# Patient Record
Sex: MALE | Employment: UNEMPLOYED | URBAN - METROPOLITAN AREA
[De-identification: names, ages, dates, MRNs, and addresses within clinical notes are randomized per-mention and may not be internally consistent; named-entity substitution may affect disease eponyms.]

---

## 2017-01-18 DIAGNOSIS — N40.0 BENIGN NON-NODULAR PROSTATIC HYPERPLASIA WITHOUT LOWER URINARY TRACT SYMPTOMS: ICD-10-CM

## 2017-01-19 RX ORDER — FINASTERIDE 5 MG/1
TABLET, FILM COATED ORAL
Qty: 90 TABLET | Refills: 1 | Status: SHIPPED | OUTPATIENT
Start: 2017-01-19 | End: 2017-05-12 | Stop reason: SDUPTHER

## 2017-01-23 RX ORDER — CLOPIDOGREL BISULFATE 75 MG/1
TABLET ORAL
Qty: 90 TABLET | Refills: 3 | Status: SHIPPED | OUTPATIENT
Start: 2017-01-23 | End: 2018-01-23 | Stop reason: SDUPTHER

## 2017-01-23 RX ORDER — CARVEDILOL 3.12 MG/1
TABLET ORAL
Qty: 180 TABLET | Refills: 3 | Status: SHIPPED | OUTPATIENT
Start: 2017-01-23 | End: 2018-01-23 | Stop reason: SDUPTHER

## 2017-02-20 RX ORDER — TAMSULOSIN HYDROCHLORIDE 0.4 MG/1
CAPSULE ORAL
Qty: 30 CAPSULE | Refills: 5 | Status: SHIPPED | OUTPATIENT
Start: 2017-02-20 | End: 2017-08-20 | Stop reason: SDUPTHER

## 2017-03-20 RX ORDER — SIMVASTATIN 40 MG
TABLET ORAL
Qty: 90 TABLET | Refills: 3 | Status: SHIPPED | OUTPATIENT
Start: 2017-03-20 | End: 2018-03-22 | Stop reason: SDUPTHER

## 2017-04-10 LAB
CHOLESTEROL, TOTAL: 125 MG/DL
CHOLESTEROL/HDL RATIO: 3.4
HDLC SERPL-MCNC: 37 MG/DL (ref 35–70)
LDL CHOLESTEROL CALCULATED: 60.2 MG/DL (ref 0–160)
PROSTATE SPECIFIC ANTIGEN: 1.23 NG/ML
TRIGL SERPL-MCNC: 139 MG/DL
VLDLC SERPL CALC-MCNC: 28 MG/DL

## 2017-04-11 DIAGNOSIS — N40.0 BENIGN PROSTATIC HYPERPLASIA WITHOUT LOWER URINARY TRACT SYMPTOMS, UNSPECIFIED MORPHOLOGY: ICD-10-CM

## 2017-04-11 DIAGNOSIS — E78.5 HYPERLIPIDEMIA, UNSPECIFIED HYPERLIPIDEMIA TYPE: ICD-10-CM

## 2017-04-11 DIAGNOSIS — E11.8 TYPE 2 DIABETES MELLITUS WITH COMPLICATION, WITHOUT LONG-TERM CURRENT USE OF INSULIN (HCC): Chronic | ICD-10-CM

## 2017-04-24 ENCOUNTER — OFFICE VISIT (OUTPATIENT)
Dept: PRIMARY CARE CLINIC | Age: 67
End: 2017-04-24
Payer: MEDICARE

## 2017-04-24 VITALS
DIASTOLIC BLOOD PRESSURE: 72 MMHG | SYSTOLIC BLOOD PRESSURE: 124 MMHG | HEIGHT: 71 IN | RESPIRATION RATE: 18 BRPM | HEART RATE: 64 BPM | WEIGHT: 200 LBS | BODY MASS INDEX: 28 KG/M2

## 2017-04-24 DIAGNOSIS — E78.5 HYPERLIPIDEMIA, UNSPECIFIED HYPERLIPIDEMIA TYPE: ICD-10-CM

## 2017-04-24 DIAGNOSIS — E11.8 TYPE 2 DIABETES MELLITUS WITH COMPLICATION, WITHOUT LONG-TERM CURRENT USE OF INSULIN (HCC): Primary | Chronic | ICD-10-CM

## 2017-04-24 DIAGNOSIS — I63.9 ISCHEMIC STROKE OF FRONTAL LOBE (HCC): ICD-10-CM

## 2017-04-24 LAB — HBA1C MFR BLD: 7.4 %

## 2017-04-24 PROCEDURE — 99214 OFFICE O/P EST MOD 30 MIN: CPT | Performed by: NURSE PRACTITIONER

## 2017-04-24 PROCEDURE — 83036 HEMOGLOBIN GLYCOSYLATED A1C: CPT | Performed by: NURSE PRACTITIONER

## 2017-04-24 ASSESSMENT — ENCOUNTER SYMPTOMS
SINUS PRESSURE: 0
NAUSEA: 0
COUGH: 0
BLOOD IN STOOL: 0
WHEEZING: 0
SHORTNESS OF BREATH: 0
CONSTIPATION: 0
DIARRHEA: 0
SORE THROAT: 0
ABDOMINAL PAIN: 0
VOMITING: 0
TROUBLE SWALLOWING: 0

## 2017-04-24 ASSESSMENT — PATIENT HEALTH QUESTIONNAIRE - PHQ9
1. LITTLE INTEREST OR PLEASURE IN DOING THINGS: 0
2. FEELING DOWN, DEPRESSED OR HOPELESS: 0
SUM OF ALL RESPONSES TO PHQ9 QUESTIONS 1 & 2: 0
SUM OF ALL RESPONSES TO PHQ QUESTIONS 1-9: 0

## 2017-08-09 ENCOUNTER — TELEPHONE (OUTPATIENT)
Dept: NEUROLOGY | Age: 67
End: 2017-08-09

## 2017-08-14 ENCOUNTER — OFFICE VISIT (OUTPATIENT)
Dept: NEUROLOGY | Age: 67
End: 2017-08-14
Payer: MEDICARE

## 2017-08-14 VITALS
HEART RATE: 79 BPM | WEIGHT: 190 LBS | HEIGHT: 72 IN | BODY MASS INDEX: 25.73 KG/M2 | SYSTOLIC BLOOD PRESSURE: 130 MMHG | DIASTOLIC BLOOD PRESSURE: 82 MMHG

## 2017-08-14 DIAGNOSIS — I63.032 CEREBROVASCULAR ACCIDENT (CVA) DUE TO THROMBOSIS OF LEFT CAROTID ARTERY (HCC): Primary | ICD-10-CM

## 2017-08-14 DIAGNOSIS — G40.309 GENERALIZED SEIZURE DISORDER (HCC): ICD-10-CM

## 2017-08-14 DIAGNOSIS — R52 PAIN AGGRAVATED BY PHYSICAL ACTIVITY: ICD-10-CM

## 2017-08-14 PROCEDURE — 99214 OFFICE O/P EST MOD 30 MIN: CPT | Performed by: PSYCHIATRY & NEUROLOGY

## 2017-08-14 RX ORDER — INDOMETHACIN 75 MG/1
75 CAPSULE, EXTENDED RELEASE ORAL
Qty: 30 CAPSULE | Refills: 1 | Status: SHIPPED | OUTPATIENT
Start: 2017-08-14 | End: 2018-01-25 | Stop reason: ALTCHOICE

## 2017-08-21 RX ORDER — TAMSULOSIN HYDROCHLORIDE 0.4 MG/1
CAPSULE ORAL
Qty: 30 CAPSULE | Refills: 5 | Status: SHIPPED | OUTPATIENT
Start: 2017-08-21 | End: 2018-02-16 | Stop reason: SDUPTHER

## 2017-08-22 ENCOUNTER — TELEPHONE (OUTPATIENT)
Dept: NEUROLOGY | Age: 67
End: 2017-08-22

## 2017-08-29 ENCOUNTER — TELEPHONE (OUTPATIENT)
Dept: NEUROLOGY | Age: 67
End: 2017-08-29

## 2017-09-12 ENCOUNTER — OFFICE VISIT (OUTPATIENT)
Dept: PRIMARY CARE CLINIC | Age: 67
End: 2017-09-12
Payer: MEDICARE

## 2017-09-12 VITALS
SYSTOLIC BLOOD PRESSURE: 144 MMHG | HEART RATE: 70 BPM | BODY MASS INDEX: 25.73 KG/M2 | RESPIRATION RATE: 16 BRPM | WEIGHT: 190 LBS | OXYGEN SATURATION: 97 % | HEIGHT: 72 IN | DIASTOLIC BLOOD PRESSURE: 80 MMHG

## 2017-09-12 DIAGNOSIS — L89.311 DECUBITUS ULCER OF RIGHT BUTTOCK, STAGE 1: Primary | ICD-10-CM

## 2017-09-12 PROCEDURE — 99214 OFFICE O/P EST MOD 30 MIN: CPT | Performed by: INTERNAL MEDICINE

## 2017-09-12 RX ORDER — BACITRACIN ZINC AND POLYMYXIN B SULFATE 500; 1000 [USP'U]/G; [USP'U]/G
OINTMENT TOPICAL
Qty: 28.35 G | Refills: 3 | Status: SHIPPED | OUTPATIENT
Start: 2017-09-12 | End: 2017-09-19

## 2017-09-12 ASSESSMENT — ENCOUNTER SYMPTOMS
VOMITING: 0
ABDOMINAL PAIN: 0
EYE DISCHARGE: 0
NAUSEA: 0
TROUBLE SWALLOWING: 0
COUGH: 0
BACK PAIN: 0
EYE REDNESS: 0
SHORTNESS OF BREATH: 0
SORE THROAT: 0

## 2017-09-13 DIAGNOSIS — I63.9 ISCHEMIC STROKE OF FRONTAL LOBE (HCC): ICD-10-CM

## 2017-09-18 RX ORDER — LEVETIRACETAM 250 MG/1
TABLET ORAL
Qty: 360 TABLET | Refills: 1 | Status: SHIPPED | OUTPATIENT
Start: 2017-09-18 | End: 2018-03-23 | Stop reason: SDUPTHER

## 2017-10-26 DIAGNOSIS — E11.8 DIABETES MELLITUS TYPE 2 WITH COMPLICATIONS (HCC): ICD-10-CM

## 2017-10-26 DIAGNOSIS — E11.8 TYPE 2 DIABETES MELLITUS WITH COMPLICATION (HCC): Chronic | ICD-10-CM

## 2017-10-26 RX ORDER — METFORMIN HYDROCHLORIDE 500 MG/1
TABLET, EXTENDED RELEASE ORAL
Qty: 360 TABLET | Refills: 3 | Status: SHIPPED | OUTPATIENT
Start: 2017-10-26 | End: 2018-01-25 | Stop reason: ALTCHOICE

## 2017-10-26 NOTE — TELEPHONE ENCOUNTER
Health Maintenance   Topic Date Due    AAA screen  1950    Hepatitis C screen  1950    HIV screen  11/12/1966    DTaP/Tdap/Td vaccine (1 - Tdap) 11/12/1970    Flu vaccine (1) 09/01/2017    Diabetic microalbuminuria test  11/01/2017 (Originally 10/27/2016)    Pneumococcal low/med risk (1 of 2 - PCV13) 04/24/2018 (Originally 11/12/2016)    Colon cancer screen colonoscopy  04/28/2018 (Originally 11/12/2001)    Lipid screen  04/10/2018    Diabetic foot exam  04/24/2018    Diabetic hemoglobin A1C test  04/24/2018    Diabetic retinal exam  04/24/2018    Zostavax vaccine  Addressed             (applicable per patient's age: Cancer Screenings, Depression Screening, Fall Risk Screening, Immunizations)    Hemoglobin A1C (%)   Date Value   04/24/2017 7.4   11/29/2016 8.3   06/14/2016 7.6     LDL Calculated (mg/dL)   Date Value   04/10/2017 60.2      (goal A1C is < 7)   (goal LDL is <100) need 30-50% reduction from baseline     BP Readings from Last 3 Encounters:   09/12/17 (!) 144/80   08/14/17 130/82   04/24/17 124/72    (goal /80)      All Future Testing planned in [facility]:  Lab Frequency Next Occurrence   Hepatitis C Antibody Once 11/29/2017       Next Visit Date:  Future Appointments  Date Time Provider Rose Rodriguez   11/21/2017 8:20 AM Ale Moore MD [facility]   12/12/2017 10:00 AM Sherrel Homans, MD [facility]            Patient Active Problem List:     Ischemic stroke of frontal lobe ([facility] [address]     Generalized seizure disorder ([address]     DM2 (diabetes mellitus, type 2) ([address]     Hyperlipidemia     Benign prostatic hyperplasia without lower urinary tract symptoms     Coronary artery disease involving native heart without angina pectoris

## 2017-11-20 ENCOUNTER — TELEPHONE (OUTPATIENT)
Dept: NEUROLOGY | Age: 67
End: 2017-11-20

## 2017-11-20 NOTE — LETTER
[id number] [facility] Neurology Specialist  [place] [address]  Phone: [phone]  Fax: [phone]    Spring Acosta MD        November 20, 2017     Patient: Alyssa Zarco   YOB: 1950   Date of Visit: 11/20/2017       To Whom It May Concern: It is my medical opinion that Trinh Mackay requires a disability parking placard for the following reasons:  He has limited walking ability due to a neurologic condition, stroke and seizures. Duration of need: permanent or greater than 7 years.               Spring Acosta MD

## 2017-12-05 ENCOUNTER — PATIENT MESSAGE (OUTPATIENT)
Dept: PRIMARY CARE CLINIC | Age: 67
End: 2017-12-05

## 2017-12-07 DIAGNOSIS — I10 BENIGN ESSENTIAL HTN: ICD-10-CM

## 2017-12-07 RX ORDER — LISINOPRIL 10 MG/1
TABLET ORAL
Qty: 90 TABLET | Refills: 1 | Status: SHIPPED | OUTPATIENT
Start: 2017-12-07 | End: 2018-06-08 | Stop reason: SDUPTHER

## 2017-12-07 NOTE — TELEPHONE ENCOUNTER
Last OV 9/12/17    Health Maintenance   Topic Date Due    AAA screen  1950    Hepatitis C screen  1950    DTaP/Tdap/Td vaccine (1 - Tdap) 11/12/1970    Diabetic microalbuminuria test  10/27/2016    Flu vaccine (1) 09/01/2017    Pneumococcal low/med risk (1 of 2 - PCV13) 04/24/2018 (Originally 11/12/2016)    Colon cancer screen colonoscopy  04/28/2018 (Originally 11/12/2001)    Lipid screen  04/10/2018    Diabetic foot exam  04/24/2018    Diabetic hemoglobin A1C test  04/24/2018    Diabetic retinal exam  04/24/2018    Zostavax vaccine  Addressed             (applicable per patient's age: Cancer Screenings, Depression Screening, Fall Risk Screening, Immunizations)    Hemoglobin A1C (%)   Date Value   04/24/2017 7.4   11/29/2016 8.3   06/14/2016 7.6     LDL Calculated (mg/dL)   Date Value   04/10/2017 60.2      (goal A1C is < 7)   (goal LDL is <100) need 30-50% reduction from baseline     BP Readings from Last 3 Encounters:   09/12/17 (!) 144/80   08/14/17 130/82   04/24/17 124/72    (goal /80)      All Future Testing planned in [facility]:  Lab Frequency Next Occurrence       Next Visit Date:  Future Appointments  Date Time Provider Rose Rodriguez   12/12/2017 10:00 AM Loida Gurrola MD [facility]            Patient Active Problem List:     Ischemic stroke of frontal lobe ([facility] [address]     Generalized seizure disorder ([address]     DM2 (diabetes mellitus, type 2) ([address]     Hyperlipidemia     Benign prostatic hyperplasia without lower urinary tract symptoms     Coronary artery disease involving native heart without angina pectoris

## 2018-01-15 NOTE — TELEPHONE ENCOUNTER
From: Low Dugan  Sent: 1/15/2018 10:56 AM EST  To: Ter[facility]rell Clinical Staff  Subject: RE: Visit Follow-Up Question    Hello, we cancelled [address] for tomorrow because of weather. we will need a new appointment.  ----- Message -----  From: Chelly Esqueda  Sent: 12/5/2017 7:32 AM EST  To: Savannah Aragon  Subject: RE: Visit Follow-Up Question  No problem, please give our office a call at [phone] to make an appointment. Thank you        ----- Message -----   From: Savannah Aragon   Sent: 12/5/2017 7:28 AM EST   To: Liam Jean MD  Subject: Visit Follow-Up Question    Dylan needs to reschedule his appointment. Perhaps in January?

## 2018-01-23 NOTE — TELEPHONE ENCOUNTER
Last OV 9/12/17    Health Maintenance   Topic Date Due    AAA screen  1950    Hepatitis C screen  1950    DTaP/Tdap/Td vaccine (1 - Tdap) 11/12/1970    Diabetic microalbuminuria test  10/27/2016    Flu vaccine (1) 09/01/2017    Pneumococcal low/med risk (1 of 2 - PCV13) 04/24/2018 (Originally 11/12/2016)    Colon cancer screen colonoscopy  04/28/2018 (Originally 11/12/2001)    Lipid screen  04/10/2018    Potassium monitoring  04/10/2018    Creatinine monitoring  04/10/2018    Diabetic foot exam  04/24/2018    A1C test (Diabetic or Prediabetic)  04/24/2018    Diabetic retinal exam  04/24/2018    Zostavax vaccine  Addressed             (applicable per patient's age: Cancer Screenings, Depression Screening, Fall Risk Screening, Immunizations)    Hemoglobin A1C (%)   Date Value   04/24/2017 7.4   11/29/2016 8.3   06/14/2016 7.6     LDL Calculated (mg/dL)   Date Value   04/10/2017 60.2      (goal A1C is < 7)   (goal LDL is <100) need 30-50% reduction from baseline     BP Readings from Last 3 Encounters:   09/12/17 (!) 144/80   08/14/17 130/82   04/24/17 124/72    (goal /80)      All Future Testing planned in [facility]:  Lab Frequency Next Occurrence   Hepatitis C Antibody Once 12/08/2018       Next Visit Date:  Future Appointments  Date Time Provider Rose Rodriguez   1/25/2018 11:00 AM José Miguel Garcia MD [facility]            Patient Active Problem List:     Ischemic stroke of frontal lobe ([facility] [address]     Generalized seizure disorder ([address]     DM2 (diabetes mellitus, type 2) ([address]     Hyperlipidemia     Benign prostatic hyperplasia without lower urinary tract symptoms     Coronary artery disease involving native heart without angina pectoris

## 2018-01-24 RX ORDER — CARVEDILOL 3.12 MG/1
TABLET ORAL
Qty: 180 TABLET | Refills: 3 | Status: SHIPPED | OUTPATIENT
Start: 2018-01-24 | End: 2018-11-01 | Stop reason: SDUPTHER

## 2018-01-24 RX ORDER — CLOPIDOGREL BISULFATE 75 MG/1
TABLET ORAL
Qty: 90 TABLET | Refills: 3 | Status: SHIPPED | OUTPATIENT
Start: 2018-01-24 | End: 2019-01-11 | Stop reason: SDUPTHER

## 2018-01-25 ENCOUNTER — OFFICE VISIT (OUTPATIENT)
Dept: PRIMARY CARE CLINIC | Age: 68
End: 2018-01-25
Payer: MEDICARE

## 2018-01-25 VITALS
HEIGHT: 72 IN | OXYGEN SATURATION: 94 % | HEART RATE: 60 BPM | SYSTOLIC BLOOD PRESSURE: 122 MMHG | DIASTOLIC BLOOD PRESSURE: 64 MMHG | TEMPERATURE: 98.2 F

## 2018-01-25 DIAGNOSIS — E11.8 TYPE 2 DIABETES MELLITUS WITH COMPLICATION, WITHOUT LONG-TERM CURRENT USE OF INSULIN (HCC): Primary | Chronic | ICD-10-CM

## 2018-01-25 DIAGNOSIS — I10 ESSENTIAL HYPERTENSION: ICD-10-CM

## 2018-01-25 LAB — HBA1C MFR BLD: 7.5 %

## 2018-01-25 PROCEDURE — 83036 HEMOGLOBIN GLYCOSYLATED A1C: CPT | Performed by: INTERNAL MEDICINE

## 2018-01-25 PROCEDURE — 99214 OFFICE O/P EST MOD 30 MIN: CPT | Performed by: INTERNAL MEDICINE

## 2018-01-25 RX ORDER — ALOGLIPTIN AND METFORMIN HYDROCHLORIDE 12.5; 1 MG/1; MG/1
TABLET, FILM COATED ORAL
Qty: 30 TABLET | Refills: 5 | Status: SHIPPED | OUTPATIENT
Start: 2018-01-25 | End: 2018-05-10 | Stop reason: ALTCHOICE

## 2018-01-25 ASSESSMENT — ENCOUNTER SYMPTOMS
COUGH: 0
VOMITING: 0
ABDOMINAL PAIN: 0
NAUSEA: 0
BACK PAIN: 0
EYE DISCHARGE: 0
SORE THROAT: 0
EYE REDNESS: 0
TROUBLE SWALLOWING: 0
SHORTNESS OF BREATH: 0

## 2018-01-25 NOTE — PROGRESS NOTES
Visit Information    Have you changed or started any medications since your last visit including any over-the-counter medicines, vitamins, or herbal medicines? no   Have you stopped taking any of your medications? Is so, why? -  no  Are you having any side effects from any of your medications? - no    Have you seen any other physician or provider since your last visit?  no   Have you had any other diagnostic tests since your last visit?  no   Have you been seen in the emergency room and/or had an admission in a hospital since we last saw you?  no   Have you had your routine dental cleaning in the past 6 months?  no     Do you have an active MyChart account? If no, what is the barrier?   No    Patient Care Team:  Liam Jean MD as PCP - General (Internal Medicine)  Maria Del Carmen Garcia MD as Consulting Physician (Hematology and Oncology)    Medical History Review  Past Medical, Family, and Social History reviewed and does not contribute to the patient presenting condition    Health Maintenance   Topic Date Due    AAA screen  1950    Hepatitis C screen  1950    DTaP/Tdap/Td vaccine (1 - Tdap) 11/12/1970    Diabetic microalbuminuria test  10/27/2016    Flu vaccine (1) 09/01/2017    Pneumococcal low/med risk (1 of 2 - PCV13) 04/24/2018 (Originally 11/12/2016)    Colon cancer screen colonoscopy  04/28/2018 (Originally 11/12/2001)    Lipid screen  04/10/2018    Potassium monitoring  04/10/2018    Creatinine monitoring  04/10/2018    Diabetic foot exam  04/24/2018    A1C test (Diabetic or Prediabetic)  04/24/2018    Diabetic retinal exam  04/24/2018    Zostavax vaccine  Addressed
microalbuminuria test  10/27/2016    Flu vaccine (1) 09/01/2017    Pneumococcal low/med risk (1 of 2 - PCV13) 04/24/2018 (Originally 11/12/2016)    Colon cancer screen colonoscopy  04/28/2018 (Originally 11/12/2001)    Lipid screen  04/10/2018    Potassium monitoring  04/10/2018    Creatinine monitoring  04/10/2018    Diabetic foot exam  04/24/2018    A1C test (Diabetic or Prediabetic)  04/24/2018    Diabetic retinal exam  04/24/2018    Zostavax vaccine  Addressed       Subjective:      Review of Systems   Constitutional: Negative for activity change, appetite change, fatigue, fever and unexpected weight change. HENT: Negative for postnasal drip, sore throat and trouble swallowing. Eyes: Negative for discharge and redness. Respiratory: Negative for cough and shortness of breath. Cardiovascular: Negative for chest pain and palpitations. Gastrointestinal: Negative for abdominal pain, nausea and vomiting. Endocrine: Negative for cold intolerance and heat intolerance. Genitourinary: Negative for difficulty urinating and dysuria. Musculoskeletal: Negative for arthralgias, back pain and myalgias. Skin: Negative for rash and wound. Neurological: Negative for dizziness and headaches. Hematological: Negative for adenopathy. Psychiatric/Behavioral: Negative for behavioral problems. The patient is not nervous/anxious. Objective:     Physical Exam   Constitutional: He is oriented to person, place, and time. He appears well-developed and well-nourished. No distress. Wheel chair bound    HENT:   Head: Normocephalic and atraumatic. Right Ear: External ear normal.   Left Ear: External ear normal.   Nose: Nose normal.   Mouth/Throat: Oropharynx is clear and moist. No oropharyngeal exudate. Eyes: Conjunctivae are normal. Right eye exhibits no discharge. Left eye exhibits no discharge. No scleral icterus. Neck: Neck supple.    Cardiovascular: Normal rate, regular rhythm and normal

## 2018-01-26 ENCOUNTER — TELEPHONE (OUTPATIENT)
Dept: PRIMARY CARE CLINIC | Age: 68
End: 2018-01-26

## 2018-02-16 RX ORDER — TAMSULOSIN HYDROCHLORIDE 0.4 MG/1
CAPSULE ORAL
Qty: 30 CAPSULE | Refills: 5 | Status: SHIPPED | OUTPATIENT
Start: 2018-02-16 | End: 2018-08-16 | Stop reason: SDUPTHER

## 2018-03-23 DIAGNOSIS — G40.309 GENERALIZED SEIZURE DISORDER (HCC): Primary | ICD-10-CM

## 2018-03-23 RX ORDER — SIMVASTATIN 40 MG
TABLET ORAL
Qty: 90 TABLET | Refills: 3 | Status: SHIPPED | OUTPATIENT
Start: 2018-03-23 | End: 2019-03-22 | Stop reason: SDUPTHER

## 2018-03-23 RX ORDER — LEVETIRACETAM 250 MG/1
TABLET ORAL
Qty: 120 TABLET | Refills: 0 | Status: SHIPPED | OUTPATIENT
Start: 2018-03-23 | End: 2018-04-17 | Stop reason: SDUPTHER

## 2018-03-23 NOTE — TELEPHONE ENCOUNTER
Last OV 01/25/2018      Health Maintenance   Topic Date Due    AAA screen  1950    Hepatitis C screen  1950    DTaP/Tdap/Td vaccine (1 - Tdap) 11/12/1970    Shingles Vaccine (1 of 2 - 2 Dose Series) 11/12/2001    Diabetic microalbuminuria test  10/27/2016    Flu vaccine (1) 09/01/2017    Pneumococcal low/med risk (1 of 2 - PCV13) 04/24/2018 (Originally 11/12/2016)    Colon cancer screen colonoscopy  04/28/2018 (Originally 11/12/2001)    Lipid screen  04/10/2018    Potassium monitoring  04/10/2018    Creatinine monitoring  04/10/2018    Diabetic foot exam  04/24/2018    Diabetic retinal exam  04/24/2018    A1C test (Diabetic or Prediabetic)  01/25/2019             (applicable per patient's age: Cancer Screenings, Depression Screening, Fall Risk Screening, Immunizations)    Hemoglobin A1C (%)   Date Value   01/25/2018 7.5   04/24/2017 7.4   11/29/2016 8.3     LDL Calculated (mg/dL)   Date Value   04/10/2017 60.2      (goal A1C is < 7)   (goal LDL is <100) need 30-50% reduction from baseline     BP Readings from Last 3 Encounters:   01/25/18 122/64   09/12/17 (!) 144/80   08/14/17 130/82    (goal /80)      All Future Testing planned in [facility]:  Lab Frequency Next Occurrence   Hepatitis C Antibody Once 12/08/2018       Next Visit Date:  Future Appointments  Date Time Provider Rose Rodriguez   7/26/2018 11:00 AM MD Ronit Jenkins            Patient Active Problem List:     Ischemic stroke of frontal lobe ([address]     Generalized seizure disorder ([address]     DM2 (diabetes mellitus, type 2) ([address]     Hyperlipidemia     Benign prostatic hyperplasia without lower urinary tract symptoms     Coronary artery disease involving native heart without angina pectoris

## 2018-04-09 DIAGNOSIS — N40.0 BENIGN NON-NODULAR PROSTATIC HYPERPLASIA WITHOUT LOWER URINARY TRACT SYMPTOMS: ICD-10-CM

## 2018-04-11 RX ORDER — FINASTERIDE 5 MG/1
TABLET, FILM COATED ORAL
Qty: 90 TABLET | Refills: 2 | Status: SHIPPED | OUTPATIENT
Start: 2018-04-11 | End: 2019-01-11 | Stop reason: SDUPTHER

## 2018-04-17 DIAGNOSIS — G40.309 GENERALIZED SEIZURE DISORDER (HCC): ICD-10-CM

## 2018-04-18 RX ORDER — LEVETIRACETAM 250 MG/1
TABLET ORAL
Qty: 120 TABLET | Refills: 0 | Status: SHIPPED | OUTPATIENT
Start: 2018-04-18 | End: 2018-05-17 | Stop reason: SDUPTHER

## 2018-05-08 ENCOUNTER — OFFICE VISIT (OUTPATIENT)
Dept: PRIMARY CARE CLINIC | Age: 68
End: 2018-05-08
Payer: MEDICARE

## 2018-05-08 ENCOUNTER — HOSPITAL ENCOUNTER (OUTPATIENT)
Dept: ULTRASOUND IMAGING | Age: 68
Discharge: HOME | End: 2018-05-10
Payer: MEDICARE

## 2018-05-08 VITALS
RESPIRATION RATE: 16 BRPM | SYSTOLIC BLOOD PRESSURE: 130 MMHG | WEIGHT: 190 LBS | BODY MASS INDEX: 25.73 KG/M2 | HEART RATE: 71 BPM | HEIGHT: 72 IN | DIASTOLIC BLOOD PRESSURE: 80 MMHG | OXYGEN SATURATION: 97 %

## 2018-05-08 DIAGNOSIS — M79.604 RIGHT LEG PAIN: ICD-10-CM

## 2018-05-08 DIAGNOSIS — Z01.89 VISIT FOR BLOOD TEST: ICD-10-CM

## 2018-05-08 DIAGNOSIS — M79.604 RIGHT LEG PAIN: Primary | ICD-10-CM

## 2018-05-08 DIAGNOSIS — E11.8 TYPE 2 DIABETES MELLITUS WITH COMPLICATION, WITHOUT LONG-TERM CURRENT USE OF INSULIN (HCC): Chronic | ICD-10-CM

## 2018-05-08 DIAGNOSIS — E55.9 VITAMIN D DEFICIENCY: ICD-10-CM

## 2018-05-08 PROCEDURE — 93971 EXTREMITY STUDY: CPT

## 2018-05-08 PROCEDURE — 99214 OFFICE O/P EST MOD 30 MIN: CPT | Performed by: INTERNAL MEDICINE

## 2018-05-08 ASSESSMENT — PATIENT HEALTH QUESTIONNAIRE - PHQ9
1. LITTLE INTEREST OR PLEASURE IN DOING THINGS: 0
2. FEELING DOWN, DEPRESSED OR HOPELESS: 0
SUM OF ALL RESPONSES TO PHQ QUESTIONS 1-9: 0
SUM OF ALL RESPONSES TO PHQ9 QUESTIONS 1 & 2: 0

## 2018-05-09 DIAGNOSIS — M79.604 RIGHT LEG PAIN: ICD-10-CM

## 2018-05-09 DIAGNOSIS — Z01.89 VISIT FOR BLOOD TEST: ICD-10-CM

## 2018-05-09 DIAGNOSIS — E55.9 VITAMIN D DEFICIENCY: ICD-10-CM

## 2018-05-09 DIAGNOSIS — E11.8 TYPE 2 DIABETES MELLITUS WITH COMPLICATION, WITHOUT LONG-TERM CURRENT USE OF INSULIN (HCC): Chronic | ICD-10-CM

## 2018-05-09 LAB
AVERAGE GLUCOSE: NORMAL
HBA1C MFR BLD: 8.1 %
MAGNESIUM: 1.8 MG/DL
SEDIMENTATION RATE, ERYTHROCYTE: 11
TOTAL CK: 49 U/L
VITAMIN D 25-HYDROXY: 16
VITAMIN D2, 25 HYDROXY: NORMAL
VITAMIN D3,25 HYDROXY: NORMAL

## 2018-05-10 ENCOUNTER — OFFICE VISIT (OUTPATIENT)
Dept: NEUROLOGY | Age: 68
End: 2018-05-10
Payer: MEDICARE

## 2018-05-10 VITALS — SYSTOLIC BLOOD PRESSURE: 122 MMHG | DIASTOLIC BLOOD PRESSURE: 71 MMHG | HEART RATE: 69 BPM

## 2018-05-10 DIAGNOSIS — R51.9 FACIAL PAIN: ICD-10-CM

## 2018-05-10 DIAGNOSIS — R56.9 SEIZURE-LIKE ACTIVITY (HCC): ICD-10-CM

## 2018-05-10 DIAGNOSIS — E11.65 POORLY CONTROLLED DIABETES MELLITUS (HCC): ICD-10-CM

## 2018-05-10 DIAGNOSIS — Z86.73 HISTORY OF STROKE: Primary | ICD-10-CM

## 2018-05-10 PROCEDURE — 99215 OFFICE O/P EST HI 40 MIN: CPT | Performed by: PSYCHIATRY & NEUROLOGY

## 2018-05-10 RX ORDER — LIDOCAINE 40 MG/G
CREAM TOPICAL
Qty: 133 G | Refills: 5 | Status: SHIPPED | OUTPATIENT
Start: 2018-05-10 | End: 2018-11-29 | Stop reason: ALTCHOICE

## 2018-05-17 DIAGNOSIS — G40.309 GENERALIZED SEIZURE DISORDER (HCC): ICD-10-CM

## 2018-05-18 RX ORDER — LEVETIRACETAM 250 MG/1
TABLET ORAL
Qty: 120 TABLET | Refills: 2 | Status: SHIPPED | OUTPATIENT
Start: 2018-05-18 | End: 2018-08-16 | Stop reason: SDUPTHER

## 2018-05-23 ENCOUNTER — TELEPHONE (OUTPATIENT)
Dept: PRIMARY CARE CLINIC | Age: 68
End: 2018-05-23

## 2018-05-23 DIAGNOSIS — E55.9 VITAMIN D DEFICIENCY: Primary | ICD-10-CM

## 2018-05-23 RX ORDER — ERGOCALCIFEROL 1.25 MG/1
50000 CAPSULE ORAL WEEKLY
Qty: 8 CAPSULE | Refills: 0 | Status: SHIPPED | OUTPATIENT
Start: 2018-05-23 | End: 2018-07-26

## 2018-05-29 ASSESSMENT — ENCOUNTER SYMPTOMS
EYE REDNESS: 0
COUGH: 0
VOMITING: 0
EYE DISCHARGE: 0
ABDOMINAL PAIN: 0
NAUSEA: 0
SORE THROAT: 0
BACK PAIN: 0
SHORTNESS OF BREATH: 0
TROUBLE SWALLOWING: 0

## 2018-05-31 ENCOUNTER — TELEPHONE (OUTPATIENT)
Dept: PRIMARY CARE CLINIC | Age: 68
End: 2018-05-31

## 2018-05-31 DIAGNOSIS — Z12.11 SCREENING FOR COLON CANCER: Primary | ICD-10-CM

## 2018-06-08 DIAGNOSIS — I10 BENIGN ESSENTIAL HTN: ICD-10-CM

## 2018-06-11 RX ORDER — LISINOPRIL 10 MG/1
TABLET ORAL
Qty: 90 TABLET | Refills: 1 | Status: SHIPPED | OUTPATIENT
Start: 2018-06-11 | End: 2018-12-05 | Stop reason: SDUPTHER

## 2018-06-15 ENCOUNTER — PROCEDURE VISIT (OUTPATIENT)
Dept: PEDIATRIC NEUROLOGY | Age: 68
End: 2018-06-15
Payer: MEDICARE

## 2018-06-15 DIAGNOSIS — R56.9 SEIZURE-LIKE ACTIVITY (HCC): Primary | ICD-10-CM

## 2018-06-15 DIAGNOSIS — G40.309 GENERALIZED SEIZURE DISORDER (HCC): Primary | ICD-10-CM

## 2018-06-15 PROCEDURE — 95816 EEG AWAKE AND DROWSY: CPT | Performed by: PSYCHIATRY & NEUROLOGY

## 2018-06-15 PROCEDURE — 95816 EEG AWAKE AND DROWSY: CPT | Performed by: NURSE PRACTITIONER

## 2018-07-26 ENCOUNTER — OFFICE VISIT (OUTPATIENT)
Dept: PRIMARY CARE CLINIC | Age: 68
End: 2018-07-26
Payer: MEDICARE

## 2018-07-26 VITALS
SYSTOLIC BLOOD PRESSURE: 104 MMHG | DIASTOLIC BLOOD PRESSURE: 62 MMHG | OXYGEN SATURATION: 96 % | RESPIRATION RATE: 18 BRPM | HEART RATE: 75 BPM

## 2018-07-26 DIAGNOSIS — E78.5 HYPERLIPIDEMIA, UNSPECIFIED HYPERLIPIDEMIA TYPE: ICD-10-CM

## 2018-07-26 DIAGNOSIS — G40.309 GENERALIZED SEIZURE DISORDER (HCC): ICD-10-CM

## 2018-07-26 DIAGNOSIS — I63.9 ISCHEMIC STROKE OF FRONTAL LOBE (HCC): ICD-10-CM

## 2018-07-26 DIAGNOSIS — N40.0 BENIGN PROSTATIC HYPERPLASIA WITHOUT LOWER URINARY TRACT SYMPTOMS: ICD-10-CM

## 2018-07-26 DIAGNOSIS — E11.8 TYPE 2 DIABETES MELLITUS WITH COMPLICATION, WITHOUT LONG-TERM CURRENT USE OF INSULIN (HCC): Primary | Chronic | ICD-10-CM

## 2018-07-26 DIAGNOSIS — I10 ESSENTIAL HYPERTENSION: ICD-10-CM

## 2018-07-26 DIAGNOSIS — E55.9 VITAMIN D DEFICIENCY: ICD-10-CM

## 2018-07-26 LAB — HBA1C MFR BLD: 8 %

## 2018-07-26 PROCEDURE — 83036 HEMOGLOBIN GLYCOSYLATED A1C: CPT | Performed by: INTERNAL MEDICINE

## 2018-07-26 PROCEDURE — 99214 OFFICE O/P EST MOD 30 MIN: CPT | Performed by: INTERNAL MEDICINE

## 2018-07-26 RX ORDER — ERGOCALCIFEROL 1.25 MG/1
50000 CAPSULE ORAL WEEKLY
Qty: 8 CAPSULE | Refills: 0 | Status: CANCELLED | OUTPATIENT
Start: 2018-07-26

## 2018-07-26 ASSESSMENT — ENCOUNTER SYMPTOMS
VOMITING: 0
TROUBLE SWALLOWING: 0
BACK PAIN: 0
SHORTNESS OF BREATH: 0
NAUSEA: 0
ABDOMINAL PAIN: 0
COUGH: 0
SORE THROAT: 0
EYE REDNESS: 0
EYE DISCHARGE: 0

## 2018-07-26 NOTE — PROGRESS NOTES
[facility] PRIMARY CARE  [address] [place] [address] [address]  [place] [address]  Dept: [phone]  Dept Fax: [phone]    Yemi Hannon is a 78 y.o. male who presents today for his medical conditions/complaints as noted below. Yemi Hannon is c/o of   Chief Complaint   Patient presents with    6 Month Follow-Up         HPI:     HPI   He is routine chr med pro check   EEG done - negative for epileptiform wave changes      He was Known to have  Hypertension ,controlled   Denies  HEADACHES , PALPITATIONS     No   complaints of dizziness. No     shortness of breath . No    chest pain .     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  Last LDL level  at goal .     dependent on himself for ADL but wife helps him 24 x 7 - 365 days     Hemoglobin A1C (%)   Date Value   07/26/2018 8.0   05/02/2018 8.1   01/25/2018 7.5             ( goal A1C is < 7)   No results found for: LABMICR  LDL Calculated (mg/dL)   Date Value   04/10/2017 60.2       (goal LDL is <100)   No results found for: AST, ALT, BUN  BP Readings from Last 3 Encounters:   07/26/18 104/62   05/10/18 122/71   05/08/18 130/80          (goal 120/80)    Past Medical History:   Diagnosis Date    BPH (benign prostatic hypertrophy)     CAD (coronary artery disease)     Cyst of neck     Diabetes mellitus (HCC)     DM2 (diabetes mellitus, type 2) ([facility] [address] 7/29/2014    Hyperlipidemia     Hypertension     Seizure ([facility]     Stroke [facility])       Past Surgical History:   Procedure Laterality Date    CORONARY ARTERY BYPASS GRAFT  1995    4 vessels    ELBOW SURGERY  2004       Family History   Problem Relation Age of Onset    Heart Disease Father        Social History   Substance Use Topics    Smoking status: Former Smoker     Packs/day: 1.00     Years: 10.00     Start date: 4/18/1997     Quit date: 4/8/2007    Smokeless tobacco: Never Used    Alcohol use 0.0 oz/week      Comment: occasional      Current Outpatient Prescriptions   Medication Sig Dispense Refill    lisinopril (PRINIVIL;ZESTRIL) 10 MG tablet TAKE ONE TABLET BY MOUTH ONCE DAILY 90 tablet 1    levETIRAcetam (KEPPRA) 250 MG tablet TAKE 2 TABLETS BY MOUTH TWICE DAILY 120 tablet 2    lidocaine (LMX) 4 % cream Apply topically as needed. 133 g 5    finasteride (PROSCAR) 5 MG tablet TAKE ONE TABLET BY MOUTH ONCE DAILY 90 tablet 2    simvastatin (ZOCOR) 40 MG tablet TAKE ONE TABLET BY MOUTH NIGHTLY 90 tablet 3    tamsulosin (FLOMAX) 0.4 MG capsule TAKE ONE CAPSULE BY MOUTH ONCE DAILY 30 capsule 5    Linagliptin-Metformin HCl ER 5-1000 MG TB24 Daily once po am (Patient taking differently: 2 times daily ) 30 tablet 5    clopidogrel (PLAVIX) 75 MG tablet TAKE ONE TABLET BY MOUTH ONCE DAILY 90 tablet 3    carvedilol (COREG) 3.125 MG tablet TAKE ONE TABLET BY MOUTH TWICE DAILY 180 tablet 3     No current facility-administered medications for this visit.       No Known Allergies    Health Maintenance   Topic Date Due    AAA screen  1950    Hepatitis C screen  1950    DTaP/Tdap/Td vaccine (1 - Tdap) 11/12/1970    Shingles Vaccine (1 of 2 - 2 Dose Series) 11/12/2001    Colon cancer screen colonoscopy  11/12/2001    Diabetic microalbuminuria test  10/27/2016    Pneumococcal low/med risk (1 of 2 - PCV13) 11/12/2016    Lipid screen  04/10/2018    Diabetic foot exam  04/24/2018    Diabetic retinal exam  04/24/2018    Flu vaccine (1) 09/01/2018    A1C test (Diabetic or Prediabetic)  05/02/2019    Potassium monitoring  05/09/2019   

## 2018-08-16 DIAGNOSIS — G40.309 GENERALIZED SEIZURE DISORDER (HCC): ICD-10-CM

## 2018-08-17 RX ORDER — TAMSULOSIN HYDROCHLORIDE 0.4 MG/1
CAPSULE ORAL
Qty: 30 CAPSULE | Refills: 5 | Status: SHIPPED | OUTPATIENT
Start: 2018-08-17 | End: 2019-02-15 | Stop reason: SDUPTHER

## 2018-08-19 RX ORDER — LEVETIRACETAM 250 MG/1
TABLET ORAL
Qty: 120 TABLET | Refills: 2 | Status: SHIPPED | OUTPATIENT
Start: 2018-08-19 | End: 2018-11-11 | Stop reason: SDUPTHER

## 2018-11-01 RX ORDER — CARVEDILOL 3.12 MG/1
TABLET ORAL
Qty: 180 TABLET | Refills: 3 | Status: SHIPPED | OUTPATIENT
Start: 2018-11-01 | End: 2020-01-21 | Stop reason: SDUPTHER

## 2018-11-11 DIAGNOSIS — G40.309 GENERALIZED SEIZURE DISORDER (HCC): ICD-10-CM

## 2018-11-11 DIAGNOSIS — E11.8 DIABETES MELLITUS TYPE 2 WITH COMPLICATIONS (HCC): ICD-10-CM

## 2018-11-11 DIAGNOSIS — E11.8 TYPE 2 DIABETES MELLITUS WITH COMPLICATION (HCC): Chronic | ICD-10-CM

## 2018-11-12 RX ORDER — METFORMIN HYDROCHLORIDE 500 MG/1
TABLET, EXTENDED RELEASE ORAL
Qty: 360 TABLET | Refills: 3 | Status: SHIPPED | OUTPATIENT
Start: 2018-11-12 | End: 2019-05-01 | Stop reason: SDUPTHER

## 2018-11-13 RX ORDER — LEVETIRACETAM 250 MG/1
TABLET ORAL
Qty: 120 TABLET | Refills: 0 | Status: SHIPPED | OUTPATIENT
Start: 2018-11-13 | End: 2018-12-28 | Stop reason: SDUPTHER

## 2018-11-29 ENCOUNTER — OFFICE VISIT (OUTPATIENT)
Dept: NEUROLOGY | Age: 68
End: 2018-11-29
Payer: MEDICARE

## 2018-11-29 VITALS — SYSTOLIC BLOOD PRESSURE: 119 MMHG | DIASTOLIC BLOOD PRESSURE: 79 MMHG | HEART RATE: 73 BPM

## 2018-11-29 DIAGNOSIS — R56.9 SEIZURE-LIKE ACTIVITY (HCC): Primary | ICD-10-CM

## 2018-11-29 DIAGNOSIS — R21 SKIN RASH: ICD-10-CM

## 2018-11-29 DIAGNOSIS — Z86.73 HISTORY OF STROKE: ICD-10-CM

## 2018-11-29 DIAGNOSIS — H57.11 EYE PAIN, RIGHT: ICD-10-CM

## 2018-11-29 PROCEDURE — 99214 OFFICE O/P EST MOD 30 MIN: CPT | Performed by: PSYCHIATRY & NEUROLOGY

## 2018-11-29 RX ORDER — CLOBETASOL PROPIONATE 0.5 MG/G
CREAM TOPICAL 2 TIMES DAILY
COMMUNITY
End: 2020-06-15 | Stop reason: ALTCHOICE

## 2018-11-29 NOTE — PROGRESS NOTES
stroke, occasional drinking. Medications: Plavix     Seizures   Onset: 5-6 years ago  Aura/warning signs:   Features: he was home, drooling, \"out of it\", not sure of the duration, only once. Risk factors: history of stroke, no family history of seizure. No history of head injury. Social: no depression/anxiety, sleep so so. Other medical issues: right facial pain, Dr. Brigette Saravia increased his Keppra dose, since then, resolved; action slower recent years, both legs and left hand tremor. Medications: keppra 500mg BID, no side effects    NEUROLOGICAL TESTS  No head imaging, EEG    Vitamin D 16  A1c 8.1-> 8.0 (7/26/2018)    [facility]       Diagnosis Date    BPH (benign prostatic hypertrophy)     CAD (coronary artery disease)     Cyst of neck     Diabetes mellitus ([facility] [address]     DM[facility]di[facility] mellitus, [facility] 7/29/2014    Hyperlipidemia     Hypertension     Seizure ([facility]     Stroke [facility])         PSH/SH/FMH: Remain unchanged since last visit  Office Visit on 07/26/2018   Component Date Value Ref Range Status    Hemoglobin A1C 07/26/2018 8.0  % Final       ALLERGIES:   No Known Allergies    MEDICATIONS:   Current Outpatient Prescriptions   Medication Sig Dispense Refill    clobetasol (TEMOVATE) 0.05 % cream Apply topically 2 times daily Apply topically 2 times daily.       levETIRAcetam (KEPPRA) 250 MG tablet TAKE 2 TABLETS BY MOUTH TWICE DAILY 120 tablet 0    metFORMIN (GLUCOPHAGE-XR) 500 MG extended release tablet TAKE TWO TABLETS BY MOUTH TWICE DAILY 360 tablet 3    carvedilol (COREG) 3.125 MG tablet TAKE ONE TABLET BY MOUTH TWICE DAILY 180 tablet 3    tamsulosin (FLOMAX) 0.4 MG capsule TAKE 1 CAPSULE BY MOUTH ONCE DAILY 30 capsule 5    lisinopril (PRINIVIL;ZESTRIL) 10 MG tablet TAKE ONE TABLET BY MOUTH ONCE DAILY 90 tablet 1    finasteride (PROSCAR) 5 MG tablet TAKE ONE TABLET BY MOUTH ONCE DAILY 90 tablet 2    simvastatin (ZOCOR) 40 MG tablet TAKE ONE TABLET BY MOUTH NIGHTLY 90 tablet 3    Linagliptin-Metformin HCl ER 5-1000 MG TB24 Daily once po am (Patient taking differently: 2 times daily ) 30 tablet 5    clopidogrel (PLAVIX) 75 MG tablet TAKE ONE TABLET BY MOUTH ONCE DAILY 90 tablet 3     No current facility-administered medications for this visit. LABS & TESTS:    No results found for: WBC, HGB, HCT, MCV, PLT      REVIEW OF SYSTEMS:      CONSTITUTIONAL Weight change: absent, Appetite change: absent, Fatigue: absent    HEENT Ears: normal, Visual disturbance: absent    RESPIRATORY Shortness of breath: absent, Cough: absent    CARDIOVASCULAR Chest pain: absent, Leg swelling :absent    GI Constipation: absent, Diarrhea: absent, Swallowing change: absent     Urinary frequency: absent, Urinary urgency: absent, Urinary incontinence: absent    MUSCULOSKELETAL Neck pain: absent, Back pain: absent, Stiffness: absent, Muscle pain: absent, Joint pain: absent Restless legs: absent    DERMATOLOGIC Hair loss: absent, Skin changes: absent    NEUROLOGIC Memory loss: absent, Confusion: absent, Seizures: absent Trouble walking or imbalance: present, Dizziness: absent, Weakness: absent, Numbness: absent Tremor: absent, Spasm: absent, Speech difficulty: absent, Headache: absent, Light sensitivity: absent    PSYCHIATRIC Anxiety: absent, Hallucination: absent, Mood disorder: absent    HEMATOLOGIC Abnormal bleeding: absent, Anemia: absent, Clotting disorder: absent, Lymph gland changes: absent       VITALS  /79 (Site: Left Upper Arm, Position: Sitting)   Pulse 73       PHYSICAL EXAMINATIONS:     General appearance: cooperative  Skin: both leg lower anterior rash, erythema  HEENT: normocephalic, red eye nodule in nasal part lower inside eyelid  Optic Fundi: deferred  Neck: supple, no cervcical adenopathy or carotid bruit  Lungs: clear to auscultation  Heart: Regular rate and rhythm, normal S1, S2. No murmurs, clicks or gallops.   Peripheral pulses: radial pulses palpable  Abdominal: BS present, soft, NT,

## 2018-11-29 NOTE — PATIENT INSTRUCTIONS
1. See eye doctor and dermatologist as soon as possible  2. Continue keppra at 500mg twice a day for now  3.  Continue stroke secondary prevention and seizure precaution    Return in 6 months    Oscar Corbin MD, MS

## 2018-12-05 DIAGNOSIS — I10 BENIGN ESSENTIAL HTN: ICD-10-CM

## 2018-12-05 RX ORDER — LISINOPRIL 10 MG/1
TABLET ORAL
Qty: 90 TABLET | Refills: 1 | Status: SHIPPED | OUTPATIENT
Start: 2018-12-05 | End: 2019-05-22 | Stop reason: SDUPTHER

## 2018-12-28 ENCOUNTER — TELEPHONE (OUTPATIENT)
Dept: GASTROENTEROLOGY | Age: 68
End: 2018-12-28

## 2018-12-28 DIAGNOSIS — G40.309 GENERALIZED SEIZURE DISORDER (HCC): ICD-10-CM

## 2018-12-28 RX ORDER — LEVETIRACETAM 250 MG/1
TABLET ORAL
Qty: 120 TABLET | Refills: 4 | Status: SHIPPED | OUTPATIENT
Start: 2018-12-28 | End: 2019-05-20 | Stop reason: SDUPTHER

## 2019-01-03 ENCOUNTER — TELEPHONE (OUTPATIENT)
Dept: GASTROENTEROLOGY | Age: 69
End: 2019-01-03

## 2019-01-11 DIAGNOSIS — N40.0 BENIGN NON-NODULAR PROSTATIC HYPERPLASIA WITHOUT LOWER URINARY TRACT SYMPTOMS: ICD-10-CM

## 2019-01-14 RX ORDER — CLOPIDOGREL BISULFATE 75 MG/1
TABLET ORAL
Qty: 90 TABLET | Refills: 3 | Status: SHIPPED | OUTPATIENT
Start: 2019-01-14 | End: 2020-01-13 | Stop reason: SDUPTHER

## 2019-01-14 RX ORDER — FINASTERIDE 5 MG/1
TABLET, FILM COATED ORAL
Qty: 90 TABLET | Refills: 2 | Status: SHIPPED | OUTPATIENT
Start: 2019-01-14 | End: 2019-10-17 | Stop reason: SDUPTHER

## 2019-02-15 RX ORDER — TAMSULOSIN HYDROCHLORIDE 0.4 MG/1
0.4 CAPSULE ORAL DAILY
Qty: 90 CAPSULE | Refills: 1 | Status: SHIPPED | OUTPATIENT
Start: 2019-02-15 | End: 2019-08-27 | Stop reason: SDUPTHER

## 2019-03-22 RX ORDER — SIMVASTATIN 40 MG
40 TABLET ORAL NIGHTLY
Qty: 90 TABLET | Refills: 1 | Status: SHIPPED | OUTPATIENT
Start: 2019-03-22 | End: 2019-09-23 | Stop reason: SDUPTHER

## 2019-03-27 ENCOUNTER — TELEPHONE (OUTPATIENT)
Dept: PRIMARY CARE CLINIC | Age: 69
End: 2019-03-27

## 2019-05-01 ENCOUNTER — OFFICE VISIT (OUTPATIENT)
Dept: FAMILY MEDICINE CLINIC | Age: 69
End: 2019-05-01
Payer: MEDICARE

## 2019-05-01 VITALS
WEIGHT: 200 LBS | TEMPERATURE: 98.1 F | SYSTOLIC BLOOD PRESSURE: 111 MMHG | BODY MASS INDEX: 28 KG/M2 | DIASTOLIC BLOOD PRESSURE: 70 MMHG | HEIGHT: 71 IN

## 2019-05-01 DIAGNOSIS — Z00.00 ENCOUNTER FOR MEDICAL EXAMINATION TO ESTABLISH CARE: ICD-10-CM

## 2019-05-01 DIAGNOSIS — Z12.11 COLON CANCER SCREENING: ICD-10-CM

## 2019-05-01 DIAGNOSIS — E11.649 TYPE 2 DIABETES MELLITUS WITH HYPOGLYCEMIA WITHOUT COMA, WITHOUT LONG-TERM CURRENT USE OF INSULIN (HCC): Primary | Chronic | ICD-10-CM

## 2019-05-01 DIAGNOSIS — Z12.5 SCREENING PSA (PROSTATE SPECIFIC ANTIGEN): ICD-10-CM

## 2019-05-01 DIAGNOSIS — Z86.73 HISTORY OF CVA (CEREBROVASCULAR ACCIDENT): ICD-10-CM

## 2019-05-01 DIAGNOSIS — I83.899 SYMPTOMATIC SPIDER VARICOSE VEIN: ICD-10-CM

## 2019-05-01 DIAGNOSIS — E55.9 VITAMIN D DEFICIENCY: ICD-10-CM

## 2019-05-01 DIAGNOSIS — E78.00 HYPERCHOLESTEREMIA: ICD-10-CM

## 2019-05-01 LAB — HBA1C MFR BLD: 8.3 %

## 2019-05-01 PROCEDURE — 83036 HEMOGLOBIN GLYCOSYLATED A1C: CPT | Performed by: PHYSICIAN ASSISTANT

## 2019-05-01 PROCEDURE — 99204 OFFICE O/P NEW MOD 45 MIN: CPT | Performed by: PHYSICIAN ASSISTANT

## 2019-05-01 RX ORDER — METFORMIN HYDROCHLORIDE 500 MG/1
TABLET, EXTENDED RELEASE ORAL
Qty: 360 TABLET | Refills: 3 | Status: SHIPPED | OUTPATIENT
Start: 2019-05-01 | End: 2020-04-24

## 2019-05-01 ASSESSMENT — PATIENT HEALTH QUESTIONNAIRE - PHQ9
SUM OF ALL RESPONSES TO PHQ QUESTIONS 1-9: 0
2. FEELING DOWN, DEPRESSED OR HOPELESS: 0
1. LITTLE INTEREST OR PLEASURE IN DOING THINGS: 0
SUM OF ALL RESPONSES TO PHQ QUESTIONS 1-9: 0
SUM OF ALL RESPONSES TO PHQ9 QUESTIONS 1 & 2: 0

## 2019-05-01 ASSESSMENT — ENCOUNTER SYMPTOMS
BACK PAIN: 0
BLOOD IN STOOL: 0
NAUSEA: 0
CHEST TIGHTNESS: 0
COLOR CHANGE: 0
SHORTNESS OF BREATH: 0
ABDOMINAL PAIN: 0
CONSTIPATION: 0
VOMITING: 0
SORE THROAT: 0
COUGH: 0
ABDOMINAL DISTENTION: 0
WHEEZING: 0
SINUS PAIN: 0
DIARRHEA: 0

## 2019-05-01 NOTE — PROGRESS NOTES
[address] [place] [address]  [address] [address]  Dept: [phone]  Dept Fax: [phone]    New Patient Visit Note  Date of patient's visit: 5/1/2019  Patient's Name:  Brittany Gunter YOB: 1950            [address] [place]  ______________________________________________________________________    Reason for visit: Establish care/Preventative care  ______________________________________________________________________  Chief Compliant   Hypertension; Diabetes; and Establish Care      ______________________________________________________________________  History of Presenting Illness:  History was obtained from the patient. Brittany Gunter is a 80 y.o. is here to establish care. Patient is here with his wife. Wife helps patient with history due to previous stroke. Patient has history of left-sided CVA with right-sided deficits that occurred 12 years ago. Patient does walk at home with a pinky walker and uses a wheelchair when he is out of the house. Patient has history of hypertension which is well controlled. Patient's diabetes fairly well controlled with hemoglobin A1c of 8.3 today. Patient does state that he indulges on sweets occasionally. Patient does follow with podiatrist and had a recent diabetic foot exam completed. Patient has not had a retinal exam and quite some time. He is instructed to follow-up with ophthalmology and agrees to this. Patient complains of 5 years of right lower extremity discomfort. He states that he is told multiple doctors, but no one has addressed it. He does have a rash that comes and goes on his leg that is treated with a cream by podiatry. Patient states that the area of the leg is painful and sometimes feels cold. Health maintenance was reviewed with patient. he is agreeable to AAA screen and blood work. He declined immunization updates.   He declined colonoscopy but is agreeable to colkashuard.    ______________________________________________________________________  Past Medical/Surgical History:        Diagnosis Date    BPH (benign prostatic hypertrophy)     CAD (coronary artery disease)     Cyst of neck     [facility]     DM2 (diabetes mellitus, type 2) ([facility] 7/29/2014    Hyperlipidemia     Hypertension     Seizure ([facility]     Stroke ([facility]            Procedure Laterality Date    CORONARY ARTERY BYPASS GRAFT  1995    4 vessels    ELBOW SURGERY  2004       ______________________________________________________________________  Health Maintenance Due   Topic Date Due    AAA screen  1950    Colon cancer screen colonoscopy  11/12/2001    Diabetic microalbuminuria test  10/27/2016    Lipid screen  04/10/2018    Diabetic retinal exam  04/24/2018    Potassium monitoring  05/09/2019    Creatinine monitoring  05/09/2019       No Known Allergies      Current Outpatient Medications   Medication Sig Dispense Refill    Cholecalciferol (VITAMIN D3) 1000 units CAPS Take 25 mcg by mouth 2 times daily      metFORMIN (GLUCOPHAGE-XR) 500 MG extended release tablet TAKE TWO TABLETS BY MOUTH TWICE DAILY 360 tablet 3    simvastatin (ZOCOR) 40 MG tablet Take 1 tablet by mouth nightly 90 tablet 1    tamsulosin (FLOMAX) 0.4 MG capsule Take 1 capsule by mouth daily 90 capsule 1    clopidogrel (PLAVIX) 75 MG tablet TAKE ONE TABLET BY MOUTH ONCE DAILY 90 tablet 3    finasteride (PROSCAR) 5 MG tablet TAKE 1 TABLET BY MOUTH ONCE DAILY 90 tablet 2    levETIRAcetam (KEPPRA) 250 MG tablet TAKE 2 TABLETS BY MOUTH TWICE DAILY 120 tablet 4    lisinopril (PRINIVIL;ZESTRIL) 10 MG tablet TAKE 1 TABLET BY MOUTH ONCE DAILY 90 tablet 1    carvedilol (COREG) 3.125 MG tablet TAKE ONE TABLET BY MOUTH TWICE DAILY 180 tablet 3    clobetasol (TEMOVATE) 0.05 % cream Apply topically 2 times daily Apply topically 2 times daily.       Linagliptin-Metformin HCl ER 5-1000 MG TB24 Daily once po am (Patient taking differently: 2 times daily ) 30 tablet 5     No current facility-administered medications for this visit. Social History     Tobacco Use    Smoking status: Former Smoker     Packs/day: 1.00     Years: 10.00     Pack years: 10.00     Start date: 1997     Last attempt to quit: 2007     Years since quittin.0    Smokeless tobacco: Never Used   Substance Use Topics    Alcohol use: Yes     Alcohol/week: 0.0 oz     Comment: occasional    Drug use: No       Family History   Problem Relation Age of Onset    Heart Disease Father       ______________________________________________________________________  Review of Systems   Constitutional: Negative for appetite change, chills, diaphoresis, fatigue, fever and unexpected weight change. HENT: Negative for sinus pain and sore throat. Eyes: Negative for visual disturbance. Respiratory: Negative for cough, chest tightness, shortness of breath and wheezing. Cardiovascular: Negative for chest pain, palpitations and leg swelling. Gastrointestinal: Negative for abdominal distention, abdominal pain, blood in stool, constipation, diarrhea, nausea and vomiting. Endocrine: Negative. Genitourinary: Negative for decreased urine volume, dysuria, frequency and urgency. Musculoskeletal: Positive for arthralgias and gait problem. Negative for back pain, joint swelling, myalgias, neck pain and neck stiffness. Skin: Positive for rash. Negative for color change, pallor and wound. Neurological: Positive for seizures, speech difficulty and numbness. Negative for dizziness, syncope, weakness and headaches. Psychiatric/Behavioral: Negative for agitation, behavioral problems, confusion, decreased concentration, dysphoric mood, hallucinations, self-injury, sleep disturbance and suicidal ideas.  The patient is not nervous/anxious and is not hyperactive.        ______________________________________________________________________  Physical Exam   Constitutional: He is oriented to person, place, and time. Vital signs are normal. He appears well-developed and well-nourished. Non-toxic appearance. He does not have a sickly appearance. He does not appear ill. No distress. HENT:   Head: Normocephalic and atraumatic. Right Ear: External ear normal.   Left Ear: External ear normal.   Nose: Nose normal.   Mouth/Throat: Oropharynx is clear and moist. No oropharyngeal exudate. Eyes: Pupils are equal, round, and reactive to light. Conjunctivae and EOM are normal. Right eye exhibits no discharge. Left eye exhibits no discharge. No scleral icterus. Neck: Normal range of motion. Neck supple. No JVD present. No tracheal deviation present. Cardiovascular: Normal rate, regular rhythm, normal heart sounds and intact distal pulses. Exam reveals no gallop and no friction rub. No murmur heard. Pulmonary/Chest: Effort normal and breath sounds normal. No stridor. No respiratory distress. He has no wheezes. He has no rales. He exhibits no tenderness. Abdominal: Soft. Bowel sounds are normal. He exhibits no distension and no mass. There is no tenderness. There is no rebound and no guarding. Musculoskeletal: He exhibits no edema. Right lower leg: He exhibits tenderness. He exhibits no bony tenderness, no swelling, no edema, no deformity and no laceration. Varicose veins noted to the right lower extremity. Negative Homans sign. Negative calf swelling anterior superficial dry rash   Neurological: He is alert and oriented to person, place, and time. He has normal reflexes. A cranial nerve deficit is present. No sensory deficit. He exhibits abnormal muscle tone. Coordination abnormal.   Skin: Skin is warm and dry. No rash noted. He is not diaphoretic. No erythema. No pallor. Psychiatric: He has a normal mood and affect.  His speech is normal and behavior is normal. Judgment and thought content normal. Cognition and memory are normal.   Nursing note and vitals reviewed. Vitals:    05/01/19 1249   BP: 111/70   Site: Left Upper Arm   Position: Sitting   Cuff Size: Medium Adult   Temp: 98.1 °F (36.7 °C)   TempSrc: Oral   Weight: 200 lb (90.7 kg)   Height: 5' 11\" (1.803 m)     BP Readings from Last 3 Encounters:   05/01/19 111/70   11/29/18 119/79   07/26/18 104/62          ______________________________________________________________________  Diagnostic findings:  CBC:No results found for: WBC, HGB, PLT    BMP:  No results found for: NA, K, CL, CO2, BUN, CREATININE, GLUCOSE    HEMOGLOBIN A1C:   Lab Results   Component Value Date    LABA1C 8.3 05/01/2019       FASTING LIPID PANEL:  Lab Results   Component Value Date    CHOL 125 04/10/2017    HDL 37 04/10/2017    TRIG 139 04/10/2017       Results for orders placed or performed in visit on 05/01/19   POCT glycosylated hemoglobin (Hb A1C)   Result Value Ref Range    Hemoglobin A1C 8.3 %       ______________________________________________________________________  Assessment and Plan:  Maricruz Hudson was seen today for hypertension, diabetes and establish care. Diagnoses and all orders for this visit:    Type 2 diabetes mellitus with hypoglycemia without coma, without long-term current use of insulin (HCC)  -     POCT glycosylated hemoglobin (Hb A1C)    Screening PSA (prostate specific antigen)  -     PSA Screening; Future    Symptomatic spider varicose vein  -     AFL - Reny rTimble MD, Vascular Surgery, [place]    Vitamin D deficiency  -     Vitamin D 25 Hydroxy; Future    Type 2 diabetes mellitus with complication (HCC)  -     metFORMIN (GLUCOPHAGE-XR) 500 MG extended release tablet; TAKE TWO TABLETS BY MOUTH TWICE DAILY    Diabetes mellitus type 2 with complications (HCC)  -     metFORMIN (GLUCOPHAGE-XR) 500 MG extended release tablet; TAKE TWO TABLETS BY MOUTH TWICE DAILY    Encounter for medical examination to establish care  -     CBC Auto Differential; Future  -     Basic Metabolic Panel;  Future  - Lipid, Fasting; Future  -     TSH With Reflex Ft4; Future    Hypercholesteremia  -     Lipid, Fasting; Future        ______________________________________________________________________  Follow up and instructions:  · Return in about 6 months (around 11/1/2019), or if symptoms worsen or fail to improve. · Discussed use, benefit, and side effects of prescribed medications. Barriers to medication compliance addressed. All patient questions answered. Pt voiced understanding. · Patient given educational materials - see patient instructions    · Patient instructed to call the office or go directly to the ER for any worsening problems or concerns. Patient voiced understanding    Rogelio Perez. [address]  5/1/2019, 1:25 PM    This note is created with the assistance of a speech-recognition program. While intending to generate a document that actually reflects the content of the visit, the document can still have some mistakes which may not have been identified and corrected by editing.

## 2019-05-13 ENCOUNTER — HOSPITAL ENCOUNTER (OUTPATIENT)
Dept: VASCULAR LAB | Facility: CLINIC | Age: 69
Discharge: HOME | End: 2019-05-13
Payer: MEDICARE

## 2019-05-13 DIAGNOSIS — Z86.73 HISTORY OF CVA (CEREBROVASCULAR ACCIDENT): ICD-10-CM

## 2019-05-13 PROCEDURE — 93978 VASCULAR STUDY: CPT

## 2019-05-20 DIAGNOSIS — G40.309 GENERALIZED SEIZURE DISORDER (HCC): ICD-10-CM

## 2019-05-20 RX ORDER — LEVETIRACETAM 250 MG/1
TABLET ORAL
Qty: 120 TABLET | Refills: 0 | Status: SHIPPED | OUTPATIENT
Start: 2019-05-20 | End: 2019-06-21 | Stop reason: SDUPTHER

## 2019-05-22 DIAGNOSIS — I10 BENIGN ESSENTIAL HTN: ICD-10-CM

## 2019-05-22 RX ORDER — LISINOPRIL 10 MG/1
10 TABLET ORAL DAILY
Qty: 90 TABLET | Refills: 1 | Status: SHIPPED | OUTPATIENT
Start: 2019-05-22 | End: 2019-12-02 | Stop reason: SDUPTHER

## 2019-05-22 NOTE — TELEPHONE ENCOUNTER
Faxed medication request, pended medication. Please advise thank you!       Last visit: 5/1/19  Last Med refill: 1/5/19      Next Visit Date:  Future Appointments   Date Time Provider Rose Leai   5/30/2019 10:00 AM Aziza Modi MD Neuro Spec [facility]   11/5/2019 11:00 AM Billelvis Joe [facility] \[facility]"" [address] Maintenance   Topic Date Due    Colon cancer screen colonoscopy  11/12/2001    Diabetic microalbuminuria test  10/27/2016    Lipid screen  04/10/2018    Diabetic retinal exam  04/24/2018    Potassium monitoring  05/09/2019    Creatinine monitoring  05/09/2019    Pneumococcal 65+ years Vaccine (1 of 2 - PCV13) 04/29/2020 (Originally 11/12/2016)    Diabetic foot exam  05/01/2020 (Originally 4/24/2018)    DTaP/Tdap/Td vaccine (1 - Tdap) 05/01/2020 (Originally 11/12/1970)    Shingles Vaccine (1 of 2) 05/01/2020 (Originally 11/12/2001)    Hepatitis C screen  05/01/2019 (Originally 1950)    Flu vaccine (Season Ended) 09/01/2019    A1C test (Diabetic or Prediabetic)  05/01/2020    AAA screen  Completed       Hemoglobin A1C (%)   Date Value   05/01/2019 8.3   07/26/2018 8.0   05/02/2018 8.1             ( goal A1C is < 7)   No results found for: LABMICR  LDL Calculated (mg/dL)   Date Value   04/10/2017 60.2       (goal LDL is <100)   No results found for: AST, ALT, BUN  BP Readings from Last 3 Encounters:   05/01/19 111/70   11/29/18 119/79   07/26/18 104/62          (goal 120/80)    All Future Testing planned in [facility]  Lab Frequency Next Occurrence   CBC Auto Differential Once 95/20/6103   Basic Metabolic Panel Once 48/00/8260   Lipid, Fasting Once 08/10/2019   TSH With Reflex Ft4 Once 08/10/2019   Vitamin D 25 Hydroxy Once 08/10/2019   PSA Screening Once 08/10/2019               Patient Active Problem List:     Ischemic stroke of frontal lobe ([facility]     Generalized seizure disorder ([address]     DM2 (diabetes mellitus, type 2) (HCC)     Hyperlipidemia     Benign prostatic hyperplasia without lower urinary tract symptoms     Coronary artery disease involving native heart without angina pectoris

## 2019-06-21 DIAGNOSIS — G40.309 GENERALIZED SEIZURE DISORDER (HCC): ICD-10-CM

## 2019-06-24 RX ORDER — LEVETIRACETAM 250 MG/1
TABLET ORAL
Qty: 120 TABLET | Refills: 0 | Status: SHIPPED | OUTPATIENT
Start: 2019-06-24 | End: 2019-07-19 | Stop reason: SDUPTHER

## 2019-07-02 ENCOUNTER — HOSPITAL ENCOUNTER (OUTPATIENT)
Age: 69
Setting detail: SPECIMEN
Discharge: HOME | End: 2019-07-02
Payer: MEDICARE

## 2019-07-02 ENCOUNTER — TELEPHONE (OUTPATIENT)
Dept: FAMILY MEDICINE CLINIC | Age: 69
End: 2019-07-02

## 2019-07-02 DIAGNOSIS — E55.9 VITAMIN D DEFICIENCY: ICD-10-CM

## 2019-07-02 DIAGNOSIS — E78.00 HYPERCHOLESTEREMIA: ICD-10-CM

## 2019-07-02 DIAGNOSIS — Z00.00 ENCOUNTER FOR MEDICAL EXAMINATION TO ESTABLISH CARE: ICD-10-CM

## 2019-07-02 DIAGNOSIS — Z12.5 SCREENING PSA (PROSTATE SPECIFIC ANTIGEN): ICD-10-CM

## 2019-07-02 LAB
ABSOLUTE EOS #: 0.7 K/UL (ref 0–0.44)
ABSOLUTE IMMATURE GRANULOCYTE: 0.05 K/UL (ref 0–0.3)
ABSOLUTE LYMPH #: 2.55 K/UL (ref 1.1–3.7)
ABSOLUTE MONO #: 1.09 K/UL (ref 0.1–1.2)
ANION GAP SERPL CALCULATED.3IONS-SCNC: 15 MMOL/L (ref 9–17)
BASOPHILS # BLD: 1 % (ref 0–2)
BASOPHILS ABSOLUTE: 0.07 K/UL (ref 0–0.2)
BUN BLDV-MCNC: 11 MG/DL (ref 8–23)
BUN/CREAT BLD: ABNORMAL (ref 9–20)
CALCIUM SERPL-MCNC: 9.4 MG/DL (ref 8.6–10.4)
CHLORIDE BLD-SCNC: 104 MMOL/L (ref 98–107)
CHOLESTEROL, FASTING: 116 MG/DL
CHOLESTEROL/HDL RATIO: 2.9
CO2: 22 MMOL/L (ref 20–31)
CREAT SERPL-MCNC: 0.71 MG/DL (ref 0.7–1.2)
DIFFERENTIAL TYPE: ABNORMAL
EOSINOPHILS RELATIVE PERCENT: 6 % (ref 1–4)
GFR AFRICAN AMERICAN: >60 ML/MIN
GFR NON-AFRICAN AMERICAN: >60 ML/MIN
GFR SERPL CREATININE-BSD FRML MDRD: ABNORMAL ML/MIN/{1.73_M2}
GFR SERPL CREATININE-BSD FRML MDRD: ABNORMAL ML/MIN/{1.73_M2}
GLUCOSE BLD-MCNC: 196 MG/DL (ref 70–99)
HCT VFR BLD CALC: 46.2 % (ref 40.7–50.3)
HDLC SERPL-MCNC: 40 MG/DL
HEMOGLOBIN: 14.5 G/DL (ref 13–17)
IMMATURE GRANULOCYTES: 0 %
LDL CHOLESTEROL: 57 MG/DL (ref 0–130)
LYMPHOCYTES # BLD: 20 % (ref 24–43)
MCH RBC QN AUTO: 27.7 PG (ref 25.2–33.5)
MCHC RBC AUTO-ENTMCNC: 31.4 G/DL (ref 28.4–34.8)
MCV RBC AUTO: 88.2 FL (ref 82.6–102.9)
MONOCYTES # BLD: 9 % (ref 3–12)
NRBC AUTOMATED: 0 PER 100 WBC
PDW BLD-RTO: 13.4 % (ref 11.8–14.4)
PLATELET # BLD: 289 K/UL (ref 138–453)
PLATELET ESTIMATE: ABNORMAL
PMV BLD AUTO: 10.1 FL (ref 8.1–13.5)
POTASSIUM SERPL-SCNC: 4.5 MMOL/L (ref 3.7–5.3)
PROSTATE SPECIFIC ANTIGEN: 1.28 UG/L
RBC # BLD: 5.24 M/UL (ref 4.21–5.77)
RBC # BLD: ABNORMAL 10*6/UL
SEG NEUTROPHILS: 64 % (ref 36–65)
SEGMENTED NEUTROPHILS ABSOLUTE COUNT: 8.16 K/UL (ref 1.5–8.1)
SODIUM BLD-SCNC: 141 MMOL/L (ref 135–144)
TRIGLYCERIDE, FASTING: 94 MG/DL
TSH SERPL DL<=0.05 MIU/L-ACNC: 0.96 MIU/L (ref 0.3–5)
VITAMIN D 25-HYDROXY: 44 NG/ML (ref 30–100)
VLDLC SERPL CALC-MCNC: ABNORMAL MG/DL (ref 1–30)
WBC # BLD: 12.6 K/UL (ref 3.5–11.3)
WBC # BLD: ABNORMAL 10*3/UL

## 2019-07-02 PROCEDURE — 82306 VITAMIN D 25 HYDROXY: CPT

## 2019-07-02 PROCEDURE — 80061 LIPID PANEL: CPT

## 2019-07-02 PROCEDURE — 36415 COLL VENOUS BLD VENIPUNCTURE: CPT

## 2019-07-02 PROCEDURE — 85025 COMPLETE CBC W/AUTO DIFF WBC: CPT

## 2019-07-02 PROCEDURE — G0103 PSA SCREENING: HCPCS

## 2019-07-02 PROCEDURE — 80048 BASIC METABOLIC PNL TOTAL CA: CPT

## 2019-07-02 PROCEDURE — 84443 ASSAY THYROID STIM HORMONE: CPT

## 2019-07-19 DIAGNOSIS — G40.309 GENERALIZED SEIZURE DISORDER (HCC): ICD-10-CM

## 2019-07-23 RX ORDER — LEVETIRACETAM 250 MG/1
TABLET ORAL
Qty: 120 TABLET | Refills: 0 | Status: SHIPPED | OUTPATIENT
Start: 2019-07-23 | End: 2019-08-25 | Stop reason: SDUPTHER

## 2019-07-29 ENCOUNTER — OFFICE VISIT (OUTPATIENT)
Dept: NEUROLOGY | Age: 69
End: 2019-07-29
Payer: MEDICARE

## 2019-07-29 VITALS
SYSTOLIC BLOOD PRESSURE: 121 MMHG | HEART RATE: 65 BPM | WEIGHT: 200 LBS | HEIGHT: 72 IN | BODY MASS INDEX: 27.09 KG/M2 | DIASTOLIC BLOOD PRESSURE: 69 MMHG

## 2019-07-29 DIAGNOSIS — R21 SKIN RASH: ICD-10-CM

## 2019-07-29 DIAGNOSIS — Z86.73 HISTORY OF STROKE: Primary | ICD-10-CM

## 2019-07-29 DIAGNOSIS — R56.9 SEIZURE-LIKE ACTIVITY (HCC): ICD-10-CM

## 2019-07-29 PROCEDURE — 99214 OFFICE O/P EST MOD 30 MIN: CPT | Performed by: PSYCHIATRY & NEUROLOGY

## 2019-07-29 NOTE — PROGRESS NOTES
TAKE TWO TABLETS BY MOUTH TWICE DAILY 360 tablet 3    simvastatin (ZOCOR) 40 MG tablet Take 1 tablet by mouth nightly 90 tablet 1    tamsulosin (FLOMAX) 0.4 MG capsule Take 1 capsule by mouth daily 90 capsule 1    clopidogrel (PLAVIX) 75 MG tablet TAKE ONE TABLET BY MOUTH ONCE DAILY 90 tablet 3    finasteride (PROSCAR) 5 MG tablet TAKE 1 TABLET BY MOUTH ONCE DAILY 90 tablet 2    clobetasol (TEMOVATE) 0.05 % cream Apply topically 2 times daily Apply topically 2 times daily.  carvedilol (COREG) 3.125 MG tablet TAKE ONE TABLET BY MOUTH TWICE DAILY 180 tablet 3     No current facility-administered medications for this visit.         LABS & TESTS:      Lab Results   Component Value Date    WBC 12.6 (H) 07/02/2019    HGB 14.5 07/02/2019    HCT 46.2 07/02/2019    MCV 88.2 07/02/2019     07/02/2019       REVIEW OF SYSTEMS:      CONSTITUTIONAL Weight change: absent, Appetite change: absent, Fatigue: absent    HEENT Ears: normal, Visual disturbance: absent    RESPIRATORY Shortness of breath: absent, Cough: absent    CARDIOVASCULAR Chest pain: absent, Leg swelling :absent    GI Constipation: absent, Diarrhea: absent, Swallowing change: absent     Urinary frequency: present, Urinary urgency: absent, Urinary incontinence: absent    MUSCULOSKELETAL Neck pain: absent, Back pain: absent, Stiffness: absent, Muscle pain: absent, Joint pain: absent Restless legs: absent    DERMATOLOGIC Hair loss: absent, Skin changes: present    NEUROLOGIC Memory loss: absent, Confusion: absent, Seizures: absent Trouble walking or imbalance: absent, Dizziness: absent, Weakness: absent, Numbness: absent Tremor: absent, Spasm: absent, Speech difficulty: absent, Headache: absent, Light sensitivity: absent    PSYCHIATRIC Anxiety: absent, Hallucination: absent, Mood disorder: absent    HEMATOLOGIC Abnormal bleeding: absent, Anemia: absent, Clotting disorder: absent, Lymph gland changes: absent     VITALS  /69 (Site: Left Upper Arm,

## 2019-07-29 NOTE — PATIENT INSTRUCTIONS
1. Continue keppra at 500mg twice a day  2. May consider repeat EEG or head imaging on next visit  3. See dermatology for skin lesion  4. Need good blood sugar control  5. Continue stroke prevention with good blood pressure control. Exercise as tolerated  6.  Seizure precaution    Return in 6-8 months    Rio Lamb MD, MS

## 2019-08-25 DIAGNOSIS — G40.309 GENERALIZED SEIZURE DISORDER (HCC): ICD-10-CM

## 2019-08-26 RX ORDER — LEVETIRACETAM 250 MG/1
TABLET ORAL
Qty: 120 TABLET | Refills: 4 | Status: SHIPPED | OUTPATIENT
Start: 2019-08-26 | End: 2020-01-22

## 2019-08-27 RX ORDER — TAMSULOSIN HYDROCHLORIDE 0.4 MG/1
0.4 CAPSULE ORAL DAILY
Qty: 90 CAPSULE | Refills: 1 | Status: SHIPPED | OUTPATIENT
Start: 2019-08-27 | End: 2020-02-21

## 2019-08-27 NOTE — TELEPHONE ENCOUNTER
Please advise pended RX.     Last visit: 5/1/2019  Last Med refill: 2/15/2019    Next Visit Date:  Future Appointments   Date Time Provider Rose Rodriguez   11/5/2019 11:00 AM Tana ALLEN [facility]   1/30/2020 10:20 AM Jose De Leon MD Neuro Spec [address] Maintenance   Topic Date Due    Colon cancer screen colonoscopy  11/12/2001    Annual Wellness Visit (AWV)  11/12/2014    Diabetic microalbuminuria test  10/27/2016    Diabetic retinal exam  04/24/2018    Pneumococcal 65+ years Vaccine (1 of 2 - PCV13) 04/29/2020 (Originally 11/12/2016)    Diabetic foot exam  05/01/2020 (Originally 4/24/2018)    DTaP/Tdap/Td vaccine (1 - Tdap) 05/01/2020 (Originally 11/12/1970)    Shingles Vaccine (1 of 2) 05/01/2020 (Originally 11/12/2001)    Hepatitis C screen  05/01/2020 (Originally 1950)    Flu vaccine (1) 09/01/2019    A1C test (Diabetic or Prediabetic)  05/01/2020    Lipid screen  07/02/2020    AAA screen  Completed       Hemoglobin A1C (%)   Date Value   05/01/2019 8.3   07/26/2018 8.0   05/02/2018 8.1             ( goal A1C is < 7)   No results found for: LABMICR  LDL Cholesterol (mg/dL)   Date Value   07/02/2019 57     LDL Calculated (mg/dL)   Date Value   04/10/2017 60.2       (goal LDL is <100)   BUN (mg/dL)   Date Value   07/02/2019 11     BP Readings from Last 3 Encounters:   07/29/19 121/69   05/01/19 111/70   11/29/18 119/79          (goal 120/80)    All Future Testing planned in [facility]              Patient Active Problem List:     Ischemic stroke of frontal lobe (HCC)     Generalized seizure disorder (HCC)     DM2 (diabetes mellitus, type 2) ([facility]     Hyperlipidemia     Benign prostatic hyperplasia without lower urinary tract symptoms     Coronary artery disease involving native heart without angina pectoris

## 2019-09-23 RX ORDER — SIMVASTATIN 40 MG
40 TABLET ORAL NIGHTLY
Qty: 90 TABLET | Refills: 0 | Status: SHIPPED | OUTPATIENT
Start: 2019-09-23 | End: 2019-12-18

## 2019-09-23 NOTE — TELEPHONE ENCOUNTER
Please Approve or Refuse.   Send to Pharmacy per Pt's Request:      Next Visit Date:  11/5/2019   Last Visit Date: 5/1/2019    Hemoglobin A1C (%)   Date Value   05/01/2019 8.3   07/26/2018 8.0   05/02/2018 8.1             ( goal A1C is < 7)   BP Readings from Last 3 Encounters:   07/29/19 121/69   05/01/19 111/70   11/29/18 119/79          (goal 120/80)  BUN   Date Value Ref Range Status   07/02/2019 11 8 - 23 mg/dL Final     CREATININE   Date Value Ref Range Status   07/02/2019 0.71 0.70 - 1.20 mg/dL Final     Potassium   Date Value Ref Range Status   07/02/2019 4.5 3.7 - 5.3 mmol/L Final

## 2019-10-17 DIAGNOSIS — N40.0 BENIGN NON-NODULAR PROSTATIC HYPERPLASIA WITHOUT LOWER URINARY TRACT SYMPTOMS: ICD-10-CM

## 2019-10-17 RX ORDER — FINASTERIDE 5 MG/1
TABLET, FILM COATED ORAL
Qty: 90 TABLET | Refills: 2 | Status: SHIPPED | OUTPATIENT
Start: 2019-10-17 | End: 2020-07-08

## 2019-12-02 DIAGNOSIS — I10 BENIGN ESSENTIAL HTN: ICD-10-CM

## 2019-12-02 RX ORDER — LISINOPRIL 10 MG/1
TABLET ORAL
Qty: 90 TABLET | Refills: 1 | Status: SHIPPED | OUTPATIENT
Start: 2019-12-02 | End: 2020-06-02

## 2019-12-13 ENCOUNTER — OFFICE VISIT (OUTPATIENT)
Dept: FAMILY MEDICINE CLINIC | Age: 69
End: 2019-12-13
Payer: MEDICARE

## 2019-12-13 VITALS
HEART RATE: 73 BPM | SYSTOLIC BLOOD PRESSURE: 120 MMHG | TEMPERATURE: 97.7 F | RESPIRATION RATE: 16 BRPM | BODY MASS INDEX: 27.12 KG/M2 | HEIGHT: 72 IN | DIASTOLIC BLOOD PRESSURE: 68 MMHG

## 2019-12-13 DIAGNOSIS — I10 BENIGN ESSENTIAL HTN: ICD-10-CM

## 2019-12-13 DIAGNOSIS — E11.649 TYPE 2 DIABETES MELLITUS WITH HYPOGLYCEMIA WITHOUT COMA, WITHOUT LONG-TERM CURRENT USE OF INSULIN (HCC): Primary | ICD-10-CM

## 2019-12-13 DIAGNOSIS — E78.00 HYPERCHOLESTEREMIA: ICD-10-CM

## 2019-12-13 LAB — HBA1C MFR BLD: 7.8 %

## 2019-12-13 PROCEDURE — 83036 HEMOGLOBIN GLYCOSYLATED A1C: CPT | Performed by: PHYSICIAN ASSISTANT

## 2019-12-13 PROCEDURE — 99213 OFFICE O/P EST LOW 20 MIN: CPT | Performed by: PHYSICIAN ASSISTANT

## 2019-12-13 ASSESSMENT — ENCOUNTER SYMPTOMS
SHORTNESS OF BREATH: 0
COUGH: 0
DIARRHEA: 0
NAUSEA: 0
CHEST TIGHTNESS: 0
BLOOD IN STOOL: 0
WHEEZING: 0
ABDOMINAL PAIN: 0
BACK PAIN: 0
VOMITING: 0
CONSTIPATION: 0

## 2019-12-18 RX ORDER — SIMVASTATIN 40 MG
40 TABLET ORAL NIGHTLY
Qty: 90 TABLET | Refills: 0 | Status: SHIPPED | OUTPATIENT
Start: 2019-12-18 | End: 2020-03-23

## 2020-01-13 ENCOUNTER — TELEPHONE (OUTPATIENT)
Dept: FAMILY MEDICINE CLINIC | Age: 70
End: 2020-01-13

## 2020-01-13 RX ORDER — CLOPIDOGREL BISULFATE 75 MG/1
TABLET ORAL
Qty: 90 TABLET | Refills: 3 | Status: SHIPPED | OUTPATIENT
Start: 2020-01-13 | End: 2021-01-11

## 2020-01-13 NOTE — TELEPHONE ENCOUNTER
Pt wife called and stated that they had to replace his lift chair. She said the insurance is fighting them and they are needing a note from you explaining his issues and diagnosis codes. She is wondering if we can just mail them to her so she can submit it.  Please advise, thank you

## 2020-01-14 NOTE — TELEPHONE ENCOUNTER
Please generate note that patient has history of ischemic stroke and seizure disorder which require a lift chair for help with transfers. Please mail to patient's home.

## 2020-01-21 RX ORDER — CARVEDILOL 3.12 MG/1
TABLET ORAL
Qty: 180 TABLET | Refills: 3 | Status: SHIPPED | OUTPATIENT
Start: 2020-01-21 | End: 2021-01-11

## 2020-01-21 NOTE — TELEPHONE ENCOUNTER
Faxed medication request, pended medication. Please advise thank you!     Last visit: 12/13/19  Last Med refill: 2/18/19      Next Visit Date:  Future Appointments   Date Time Provider Rose Rodriguez   1/27/2020  9:30 AM Monet Paul [facility]   1/30/2020 10:20 AM Alberto Bran MD Neuro Spec [facility]   6/15/2020  9:00 AM ANIYAH Michael [facility] [address] Maintenance   Topic Date Due    Colon cancer screen colonoscopy  11/12/2001    Diabetic microalbuminuria test  10/27/2016    Diabetic retinal exam  04/24/2018    Annual Wellness Visit (AWV)  05/29/2019    Pneumococcal 65+ years Vaccine (1 of 1 - PPSV23) 04/29/2020 (Originally 11/12/2016)    Diabetic foot exam  05/01/2020 (Originally 4/24/2018)    DTaP/Tdap/Td vaccine (1 - Tdap) 05/01/2020 (Originally 11/12/1962)    Shingles Vaccine (1 of 2) 05/01/2020 (Originally 11/12/2001)    Hepatitis C screen  05/01/2020 (Originally 1950)    Flu vaccine (1) 12/13/2020 (Originally 9/1/2019)    Lipid screen  07/02/2020    Potassium monitoring  07/02/2020    Creatinine monitoring  07/02/2020    A1C test (Diabetic or Prediabetic)  12/13/2020    AAA screen  Completed       Hemoglobin A1C (%)   Date Value   12/13/2019 7.8   05/01/2019 8.3   07/26/2018 8.0             ( goal A1C is < 7)   No results found for: LABMICR  LDL Cholesterol (mg/dL)   Date Value   07/02/2019 57     LDL Calculated (mg/dL)   Date Value   04/10/2017 60.2       (goal LDL is <100)   BUN (mg/dL)   Date Value   07/02/2019 11     BP Readings from Last 3 Encounters:   12/13/19 120/68   07/29/19 121/69   05/01/19 111/70          (goal 120/80)    All Future Testing planned in [facility]              Patient Active Problem List:     Ischemic stroke of frontal lobe (HCC)     Generalized seizure disorder (HCC)     DM2 (diabetes mellitus, type 2) ([facility]     Hyperlipidemia     Benign prostatic hyperplasia without lower urinary tract symptoms     Coronary artery disease

## 2020-01-22 RX ORDER — LEVETIRACETAM 250 MG/1
TABLET ORAL
Qty: 120 TABLET | Refills: 0 | Status: SHIPPED | OUTPATIENT
Start: 2020-01-22 | End: 2020-02-21

## 2020-01-27 ENCOUNTER — TELEPHONE (OUTPATIENT)
Dept: FAMILY MEDICINE CLINIC | Age: 70
End: 2020-01-27

## 2020-01-27 ENCOUNTER — OFFICE VISIT (OUTPATIENT)
Dept: FAMILY MEDICINE CLINIC | Age: 70
End: 2020-01-27
Payer: MEDICARE

## 2020-01-27 VITALS
SYSTOLIC BLOOD PRESSURE: 111 MMHG | HEART RATE: 65 BPM | HEIGHT: 72 IN | BODY MASS INDEX: 27.12 KG/M2 | DIASTOLIC BLOOD PRESSURE: 68 MMHG | TEMPERATURE: 97.9 F | RESPIRATION RATE: 16 BRPM

## 2020-01-27 PROCEDURE — G0438 PPPS, INITIAL VISIT: HCPCS | Performed by: PHYSICIAN ASSISTANT

## 2020-01-27 ASSESSMENT — LIFESTYLE VARIABLES
HOW MANY STANDARD DRINKS CONTAINING ALCOHOL DO YOU HAVE ON A TYPICAL DAY: 0
HOW OFTEN DO YOU HAVE SIX OR MORE DRINKS ON ONE OCCASION: 0
HOW OFTEN DURING THE LAST YEAR HAVE YOU FOUND THAT YOU WERE NOT ABLE TO STOP DRINKING ONCE YOU HAD STARTED: 0
HOW OFTEN DURING THE LAST YEAR HAVE YOU FAILED TO DO WHAT WAS NORMALLY EXPECTED FROM YOU BECAUSE OF DRINKING: 0
AUDIT TOTAL SCORE: 1
HOW OFTEN DURING THE LAST YEAR HAVE YOU NEEDED AN ALCOHOLIC DRINK FIRST THING IN THE MORNING TO GET YOURSELF GOING AFTER A NIGHT OF HEAVY DRINKING: 0
HAVE YOU OR SOMEONE ELSE BEEN INJURED AS A RESULT OF YOUR DRINKING: 0
AUDIT-C TOTAL SCORE: 1
HOW OFTEN DO YOU HAVE A DRINK CONTAINING ALCOHOL: 1
HAS A RELATIVE, FRIEND, DOCTOR, OR ANOTHER HEALTH PROFESSIONAL EXPRESSED CONCERN ABOUT YOUR DRINKING OR SUGGESTED YOU CUT DOWN: 0
HOW OFTEN DURING THE LAST YEAR HAVE YOU BEEN UNABLE TO REMEMBER WHAT HAPPENED THE NIGHT BEFORE BECAUSE YOU HAD BEEN DRINKING: 0
HOW OFTEN DURING THE LAST YEAR HAVE YOU HAD A FEELING OF GUILT OR REMORSE AFTER DRINKING: 0

## 2020-01-27 ASSESSMENT — PATIENT HEALTH QUESTIONNAIRE - PHQ9
SUM OF ALL RESPONSES TO PHQ QUESTIONS 1-9: 0
SUM OF ALL RESPONSES TO PHQ QUESTIONS 1-9: 0

## 2020-01-27 NOTE — PROGRESS NOTES
Medicare Annual Wellness Visit  Name: Ivelisse Caruso Date: 2020   MRN: [id number] Sex: Male   Age: 77 y.o. Ethnicity: Non-/Non    : 1950 Race: Unavailable      Jose Aragon is here for Medicare AWV; Diabetes (GAP); and Seizures (GAP)    Screenings for behavioral, psychosocial and functional/safety risks, and cognitive dysfunction are all negative except as indicated below. These results, as well as other patient data from the [address] form, are documented in Flowsheets linked to this Encounter. No Known Allergies      Prior to Visit Medications    Medication Sig Taking? Authorizing Provider   levETIRAcetam (KEPPRA) 250 MG tablet TAKE 2 TABLETS BY MOUTH TWICE DAILY MUST  MAKE  APPOINTMENT Yes Aidan Haile MD   carvedilol (COREG) 3.125 MG tablet TAKE ONE TABLET BY MOUTH TWICE DAILY Yes Phill Rebollar PA-C   clopidogrel (PLAVIX) 75 MG tablet TAKE ONE TABLET BY MOUTH ONCE DAILY Yes Laxmi Pierson PA-C   simvastatin (ZOCOR) 40 MG tablet TAKE 1 TABLET BY MOUTH NIGHTLY Yes Laxmi Pierson PA-C   triamcinolone (KENALOG) 0.1 % ointment APPLY OINTMENT TOPICALLY TO AFFECTED AREA TWICE DAILY FOR 14 DAYS AND THEN AS NEEDED FOR SCALE OR ITCH. DO NOT APPLY TO FACE GROIN OR ARMPIT Yes Historical Provider, MD   lisinopril (PRINIVIL;ZESTRIL) 10 MG tablet TAKE 1 TABLET BY MOUTH ONCE DAILY Yes Laxmi Pierson PA-C   finasteride (PROSCAR) 5 MG tablet TAKE 1 TABLET BY MOUTH ONCE DAILY Yes Phill Rebollar PA-C   tamsulosin (FLOMAX) 0.4 MG capsule Take 1 capsule by mouth daily Yes Phill Rebollar PA-C   Cholecalciferol (VITAMIN D3) 1000 units CAPS Take 25 mcg by mouth 2 times daily Yes Historical Provider, MD   metFORMIN (GLUCOPHAGE-XR) 500 MG extended release tablet TAKE TWO TABLETS BY MOUTH TWICE DAILY Yes Laxmi Pierson PA-C   clobetasol (TEMOVATE) 0.05 % cream Apply topically 2 times daily Apply topically 2 times daily.  Yes Historical Provider, MD         Past Medical normal S1 and S2, no murmurs, rubs, clicks, or gallops, distal pulses intact, no carotid bruits  Abdomen: soft, non-tender, non-distended, normal bowel sounds, no masses or organomegaly  Extremities: no cyanosis, clubbing or edema  Musculoskeletal: Right-sided upper and lower extremity decreased muscle tone and strength chronic since CVA  Neurologic: Chronic decrease sensation right upper lower extremity decreased  strength and reflexes    Patient's complete Health Risk Assessment and screening values have been reviewed and are found in Flowsheets. The following problems were reviewed today and where indicated follow up appointments were made and/or referrals ordered. Positive Risk Factor Screenings with Interventions:     Fall Risk:  Timed Up and Go Test > 12 seconds? (Complete if either Fall Risk answers are Yes): no(pt in wheel chair)  2 or more falls in past year?: (!) yes  Fall with injury in past year?: no  Fall Risk Interventions:    · Home safety tips provided    Cognitive: Words recalled: 0 Words Recalled  Clock Drawing Test (CDT) Score: (!) Abnormal  Total Score Interpretation: Positive Mini-Cog  Did the patient refuse to take the cognition test?: No  Cognitive Impairment Interventions:  · Patient is followed by neurology currently    General Health:  General  In general, how would you say your health is?: Good  In the past 7 days, have you experienced any of the following?  New or Increased Pain, New or Increased Fatigue, Loneliness, Social Isolation, Stress or Anger?: (!) New or Increased Fatigue  Do you get the social and emotional support that you need?: Yes  Do you have a Living Will?: Yes  General Health Risk Interventions:  · Patient recently homebound due to illness and difficulty with ambulation    Health Habits/Nutrition:  Health Habits/Nutrition  Do you exercise for at least 20 minutes 2-3 times per week?: (!) No  Have you lost any weight without trying in the past 3 months?: No  Do you eat fewer than 2 meals per day?: No  Have you seen a dentist within the past year?: Yes  Body mass index is 27.12 kg/m². Health Habits/Nutrition Interventions:  · Inadequate physical activity:  Patient is seeing physical therapy in the home twice weekly    Hearing/Vision:  No exam data present  Hearing/Vision  Do you or your family notice any trouble with your hearing?: No  Do you have difficulty driving, watching TV, or doing any of your daily activities because of your eyesight?: No  Have you had an eye exam within the past year?: (!) No  Hearing/Vision Interventions:  · No new concerns but advised to have eye and hearing test completed    ADL:  ADLs  In the past 7 days, did you need help from others to perform any of the following everyday activities? Eating, dressing, grooming, bathing, toileting, or walking/balance?: (!) Dressing, Grooming, Bathing, Walking/Balance  In the past 7 days, did you need help from others to take care of any of the following? Laundry, housekeeping, banking/finances, shopping, telephone use, food preparation, transportation, or taking medications?: [employer], Housekeeping, [employer], Shopping, Food Preparation, Transportation, Taking Medications  ADL Interventions:  · Patient has visiting nurse once weekly. Wife takes care of patient at home on a daily basis. Offered further home care help if needed    Personalized Preventive Plan   Current Health Maintenance Status    There is no immunization history on file for this patient.      Health Maintenance   Topic Date Due    Colon cancer screen colonoscopy  11/12/2001    Diabetic microalbuminuria test  10/27/2016    Diabetic retinal exam  04/24/2018    Annual Wellness Visit (AWV)  05/29/2019    Pneumococcal 65+ years Vaccine (1 of 1 - PPSV23) 04/29/2020 (Originally 11/12/2016)    Diabetic foot exam  05/01/2020 (Originally 4/24/2018)    DTaP/Tdap/Td vaccine (1 - Tdap) 05/01/2020 (Originally 11/12/1962)    Shingles Vaccine

## 2020-01-27 NOTE — TELEPHONE ENCOUNTER
[id number] called to make sure that their services were being followed by [facility].

## 2020-02-21 RX ORDER — LEVETIRACETAM 250 MG/1
TABLET ORAL
Qty: 120 TABLET | Refills: 1 | Status: SHIPPED | OUTPATIENT
Start: 2020-02-21 | End: 2020-04-24

## 2020-03-11 ENCOUNTER — TELEPHONE (OUTPATIENT)
Dept: FAMILY MEDICINE CLINIC | Age: 70
End: 2020-03-11

## 2020-03-22 NOTE — TELEPHONE ENCOUNTER
Next Visit Date:  Future Appointments   Date Time Provider Rose Rodriguez   4/9/2020 11:20 AM Janie Freeman MD Neuro Spec [email or web address]   6/15/2020  9:00 AM Ashley Casey [email or web address]   7/27/2020  9:15 AM [facility]angus Moyer [facility] [address] Maintenance   Topic Date Due    Hepatitis B vaccine (1 of 3 - Risk 3-dose series) 11/12/1970    Colon cancer screen colonoscopy  11/12/2001    Diabetic microalbuminuria test  10/27/2016    Diabetic retinal exam  04/24/2018    Pneumococcal 65+ years Vaccine (1 of 1 - PPSV23) 04/29/2020 (Originally 11/12/2016)    Diabetic foot exam  05/01/2020 (Originally 4/24/2018)    DTaP/Tdap/Td vaccine (1 - Tdap) 05/01/2020 (Originally 11/12/1970)    Shingles Vaccine (1 of 2) 05/01/2020 (Originally 11/12/2001)    Hepatitis C screen  05/01/2019 (Originally 1950)    Flu vaccine (1) 12/13/2020 (Originally 9/1/2019)    Lipid screen  07/02/2020    Potassium monitoring  07/02/2020    Creatinine monitoring  07/02/2020    A1C test (Diabetic or Prediabetic)  12/13/2020    Annual Wellness Visit (AWV)  01/27/2021    AAA screen  Completed    Hepatitis A vaccine  Aged Out    Hib vaccine  Aged Out    Meningococcal (ACWY) vaccine  Aged Out       Hemoglobin A1C (%)   Date Value   12/13/2019 7.8   05/01/2019 8.3   07/26/2018 8.0             ( goal A1C is < 7)   No results found for: LABMICR  LDL Cholesterol (mg/dL)   Date Value   07/02/2019 57     LDL Calculated (mg/dL)   Date Value   04/10/2017 60.2       (goal LDL is <100)   BUN (mg/dL)   Date Value   07/02/2019 11     BP Readings from Last 3 Encounters:   01/27/20 111/68   12/13/19 120/68   07/29/19 121/69          (goal 120/80)    All Future Testing planned in [facility]              Patient Active Problem List:     Ischemic stroke of frontal lobe (HCC)     Generalized seizure disorder (HCC)     DM2 (diabetes mellitus, type 2) ([facility]     Hyperlipidemia     Benign prostatic hyperplasia without lower urinary tract symptoms     Coronary artery disease involving native heart without angina pectoris

## 2020-03-23 RX ORDER — SIMVASTATIN 40 MG
40 TABLET ORAL NIGHTLY
Qty: 90 TABLET | Refills: 0 | Status: SHIPPED | OUTPATIENT
Start: 2020-03-23 | End: 2020-06-18

## 2020-04-24 RX ORDER — METFORMIN HYDROCHLORIDE 500 MG/1
TABLET, EXTENDED RELEASE ORAL
Qty: 360 TABLET | Refills: 0 | Status: SHIPPED | OUTPATIENT
Start: 2020-04-24 | End: 2020-06-15 | Stop reason: SINTOL

## 2020-06-02 RX ORDER — LISINOPRIL 10 MG/1
TABLET ORAL
Qty: 90 TABLET | Refills: 0 | Status: SHIPPED | OUTPATIENT
Start: 2020-06-02 | End: 2020-08-24

## 2020-06-15 ENCOUNTER — OFFICE VISIT (OUTPATIENT)
Dept: FAMILY MEDICINE CLINIC | Age: 70
End: 2020-06-15
Payer: MEDICARE

## 2020-06-15 VITALS
SYSTOLIC BLOOD PRESSURE: 118 MMHG | HEART RATE: 67 BPM | DIASTOLIC BLOOD PRESSURE: 60 MMHG | TEMPERATURE: 97.7 F | RESPIRATION RATE: 16 BRPM | OXYGEN SATURATION: 97 %

## 2020-06-15 PROCEDURE — 99214 OFFICE O/P EST MOD 30 MIN: CPT | Performed by: PHYSICIAN ASSISTANT

## 2020-06-15 RX ORDER — GLIMEPIRIDE 4 MG/1
4 TABLET ORAL EVERY MORNING
Qty: 30 TABLET | Refills: 3 | Status: SHIPPED | OUTPATIENT
Start: 2020-06-15 | End: 2020-10-01

## 2020-06-15 SDOH — ECONOMIC STABILITY: TRANSPORTATION INSECURITY
IN THE PAST 12 MONTHS, HAS THE LACK OF TRANSPORTATION KEPT YOU FROM MEDICAL APPOINTMENTS OR FROM GETTING MEDICATIONS?: NO

## 2020-06-15 SDOH — ECONOMIC STABILITY: FOOD INSECURITY: WITHIN THE PAST 12 MONTHS, YOU WORRIED THAT YOUR FOOD WOULD RUN OUT BEFORE YOU GOT MONEY TO BUY MORE.: NEVER TRUE

## 2020-06-15 SDOH — ECONOMIC STABILITY: FOOD INSECURITY: WITHIN THE PAST 12 MONTHS, THE FOOD YOU BOUGHT JUST DIDN'T LAST AND YOU DIDN'T HAVE MONEY TO GET MORE.: NEVER TRUE

## 2020-06-15 SDOH — ECONOMIC STABILITY: TRANSPORTATION INSECURITY
IN THE PAST 12 MONTHS, HAS LACK OF TRANSPORTATION KEPT YOU FROM MEETINGS, WORK, OR FROM GETTING THINGS NEEDED FOR DAILY LIVING?: NO

## 2020-06-15 SDOH — ECONOMIC STABILITY: INCOME INSECURITY: HOW HARD IS IT FOR YOU TO PAY FOR THE VERY BASICS LIKE FOOD, HOUSING, MEDICAL CARE, AND HEATING?: NOT HARD AT ALL

## 2020-06-15 ASSESSMENT — ENCOUNTER SYMPTOMS
CHEST TIGHTNESS: 0
NAUSEA: 0
ABDOMINAL DISTENTION: 0
SINUS PAIN: 0
BLOOD IN STOOL: 0
EYE REDNESS: 0
CONSTIPATION: 0
VOMITING: 0
SORE THROAT: 0
SHORTNESS OF BREATH: 0
DIARRHEA: 0
PHOTOPHOBIA: 0
RHINORRHEA: 0
BACK PAIN: 0
COLOR CHANGE: 0
TROUBLE SWALLOWING: 0
WHEEZING: 0
ABDOMINAL PAIN: 0
COUGH: 0
SINUS PRESSURE: 0

## 2020-06-15 NOTE — PROGRESS NOTES
[address] PRIMARY CARE  [address] [place] [address]  Dept: [phone]  Dept Fax: [phone]    Office Progress Note  Date of patient's visit: 6/15/2020  Patient's Name:  Zion Rodriguez YOB: 1950            ANA GARCIA  ================================================================    REASON FOR VISIT/CHIEF COMPLAINT:  6 Month Follow-Up; Diabetes (GAP); Seizures (GAP); and Health Maintenance (declines all vaccines )    HISTORY OF PRESENTING ILLNESS:  History was obtained from: patient. Zion Rodriguez is a 77 y.o. is here for follow up for diabetes, hypertension, hypercholesterolemia. Patient and his wife state that they have not been eating well due to COVID-19 pandemic and quarantine. She would like for the patient to stop taking metformin due to information she has read about the medications. Patient inquiring about vitamin supplements instead. We discussed options and agreeable to stopping metformin and starting Amaryl. We discussed possible weekly injections but declined at this time per patient's wife. Patient has no new complaints today. Blood work is ordered. Health maintenance reviewed and patient declined colonoscopy as well as all immunizations.       Patient Active Problem List   Diagnosis    Ischemic stroke of frontal lobe ([facility]    Generalized seizure disorder ([facility]    DM2 (diabetes mellitus, type 2) ([facility]    Hyperlipidemia    Benign prostatic hyperplasia without lower urinary tract symptoms    Coronary artery disease involving native heart without angina pectoris       Health Maintenance Due   Topic Date Due    Colon cancer screen colonoscopy  11/12/2001    Diabetic microalbuminuria test  10/27/2016    Diabetic foot exam  04/24/2018    Diabetic retinal exam  04/24/2018       No Known Allergies      Current Outpatient Medications   Medication Sig Dispense Refill    glimepiride (AMARYL) 4 MG tablet Take 1 tablet by mouth every morning 30 tablet 3    lisinopril (PRINIVIL;ZESTRIL) 10 MG tablet Take 1 tablet by mouth once daily 90 tablet 0    levETIRAcetam (KEPPRA) 250 MG tablet Take 2 tablets by mouth twice daily 120 tablet 3    simvastatin (ZOCOR) 40 MG tablet Take 1 tablet by mouth nightly 90 tablet 0    tamsulosin (FLOMAX) 0.4 MG capsule TAKE 1 CAPSULE BY MOUTH ONCE DAILY 90 capsule 1    carvedilol (COREG) 3.125 MG tablet TAKE ONE TABLET BY MOUTH TWICE DAILY 180 tablet 3    clopidogrel (PLAVIX) 75 MG tablet TAKE ONE TABLET BY MOUTH ONCE DAILY 90 tablet 3    finasteride (PROSCAR) 5 MG tablet TAKE 1 TABLET BY MOUTH ONCE DAILY 90 tablet 2    Cholecalciferol (VITAMIN D3) 1000 units CAPS Take 25 mcg by mouth 2 times daily       No current facility-administered medications for this visit. Social History     Tobacco Use    Smoking status: Former Smoker     Packs/day: 1.00     Years: 10.00     Pack years: 10.00     Start date: 1997     Last attempt to quit: 2007     Years since quittin.1    Smokeless tobacco: Never Used   Substance Use Topics    Alcohol use: Not Currently     Alcohol/week: 0.0 standard drinks     Comment: occasional    Drug use: No       Family History   Problem Relation Age of Onset    Heart Disease Father         Review of Systems   Constitutional: Negative for appetite change, chills, diaphoresis, fatigue, fever and unexpected weight change. HENT: Negative for congestion, ear discharge, ear pain, postnasal drip, rhinorrhea, sinus pressure, sinus pain, sore throat, tinnitus and trouble swallowing. Eyes: Negative for photophobia, redness and visual disturbance. Respiratory: Negative for cough, chest tightness, shortness of breath and wheezing. Cardiovascular: Negative for chest pain, palpitations and leg swelling. Gastrointestinal: Negative for abdominal distention, abdominal pain, blood in stool, constipation, diarrhea, nausea and vomiting.    Endocrine: Negative. Genitourinary: Negative for decreased urine volume, difficulty urinating, dysuria, frequency, hematuria and urgency. Musculoskeletal: Negative for back pain and gait problem. Skin: Negative for color change, pallor and rash. Neurological: Positive for speech difficulty ( chronic and unchanged), weakness ( chronic and unchanged) and numbness ( chronic and unchanged). Negative for dizziness, syncope, light-headedness and headaches. Hematological: Negative for adenopathy. Does not bruise/bleed easily. Psychiatric/Behavioral: Negative for agitation, behavioral problems, confusion, decreased concentration, dysphoric mood, hallucinations, self-injury, sleep disturbance and suicidal ideas. The patient is not nervous/anxious and is not hyperactive. Physical Exam  Vitals signs and nursing note reviewed. Constitutional:       General: He is not in acute distress. Appearance: Normal appearance. He is well-developed. He is not ill-appearing, toxic-appearing or diaphoretic. HENT:      Head: Normocephalic and atraumatic. Eyes:      General: No scleral icterus. Right eye: No discharge. Left eye: No discharge. Conjunctiva/sclera: Conjunctivae normal.   Neck:      Musculoskeletal: Normal range of motion and neck supple. Thyroid: No thyroid mass, thyromegaly or thyroid tenderness. Cardiovascular:      Rate and Rhythm: Normal rate and regular rhythm. Heart sounds: Normal heart sounds. No murmur. No friction rub. No gallop. Pulmonary:      Effort: Pulmonary effort is normal. No respiratory distress. Breath sounds: Normal breath sounds. No wheezing or rales. Chest:      Chest wall: No tenderness. Abdominal:      General: Bowel sounds are normal.      Palpations: Abdomen is soft. Tenderness: There is no abdominal tenderness. Musculoskeletal:      Right lower leg: No edema. Left lower leg: No edema.    Skin:     General: Skin is warm and dry.   Neurological:      Mental Status: He is alert and oriented to person, place, and time. Mental status is at baseline. Psychiatric:         Attention and Perception: Attention and perception normal.         Mood and Affect: Mood and affect normal.         Speech: Speech normal.         Behavior: Behavior normal. Behavior is cooperative. Thought Content: Thought content normal.         Cognition and Memory: Cognition and memory normal.         Judgment: Judgment normal.           Vitals:    06/15/20 0859   BP: 118/60   Pulse: 67   Resp: 16   Temp: 97.7 °F (36.5 °C)   SpO2: 97%     BP Readings from Last 3 Encounters:   06/15/20 118/60   01/27/20 111/68   12/13/19 120/68              DIAGNOSTIC FINDINGS:  CBC:  Lab Results   Component Value Date    WBC 12.6 07/02/2019    HGB 14.5 07/02/2019     07/02/2019       BMP:    Lab Results   Component Value Date     07/02/2019    K 4.5 07/02/2019     07/02/2019    CO2 22 07/02/2019    BUN 11 07/02/2019    CREATININE 0.71 07/02/2019    GLUCOSE 196 07/02/2019         FASTING LIPID PANEL:  Lab Results   Component Value Date    CHOL 125 04/10/2017    HDL 40 (L) 07/02/2019    TRIG 139 04/10/2017       No results found for this visit on 06/15/20. ASSESSMENT AND PLAN:   Diagnosis Orders   1. Type 2 diabetes mellitus with hypoglycemia without coma, without long-term current use of insulin ([facility])   DIABETES FOOT EXAM    Microalbumin, Ur    glimepiride (AMARYL) 4 MG tablet    CBC Auto Differential    Comprehensive Metabolic Panel    Hemoglobin A1C   2. Benign essential HTN  TSH With Reflex Ft4   3. Hypercholesteremia  Lipid, Fasting   4. Screening PSA (prostate specific antigen)  PSA Screening   5. Vitamin D deficiency  Vitamin D 25 Hydroxy       FOLLOW UP AND INSTRUCTIONS:  Return in about 3 months (around 9/15/2020), or if symptoms worsen or fail to improve. · Discussed use, benefit, and side effects of prescribed medications.   Barriers to

## 2020-06-18 RX ORDER — SIMVASTATIN 40 MG
40 TABLET ORAL NIGHTLY
Qty: 90 TABLET | Refills: 0 | Status: SHIPPED | OUTPATIENT
Start: 2020-06-18 | End: 2020-09-26

## 2020-06-18 NOTE — TELEPHONE ENCOUNTER
Ischemic stroke of frontal lobe (HCC)     Generalized seizure disorder (HCC)     [facility]     Hyperlipidemia     Benign prostatic hyperplasia without lower urinary tract symptoms     Coronary artery disease involving native heart without angina pectoris

## 2020-07-08 RX ORDER — FINASTERIDE 5 MG/1
TABLET, FILM COATED ORAL
Qty: 90 TABLET | Refills: 0 | Status: SHIPPED | OUTPATIENT
Start: 2020-07-08 | End: 2020-09-26

## 2020-07-08 NOTE — TELEPHONE ENCOUNTER
([facility]     [facility]     DM2 (diabetes mellitus, type 2) [facility]     Hyperlipidemia     Benign prostatic hyperplasia without lower urinary tract symptoms     Coronary artery disease involving native heart without angina pectoris

## 2020-07-17 ENCOUNTER — HOSPITAL ENCOUNTER (OUTPATIENT)
Age: 70
Setting detail: SPECIMEN
Discharge: HOME | End: 2020-07-17
Payer: MEDICARE

## 2020-07-17 LAB
ABSOLUTE EOS #: 0.86 K/UL (ref 0–0.44)
ABSOLUTE IMMATURE GRANULOCYTE: 0.05 K/UL (ref 0–0.3)
ABSOLUTE LYMPH #: 2.72 K/UL (ref 1.1–3.7)
ABSOLUTE MONO #: 1.23 K/UL (ref 0.1–1.2)
ALBUMIN SERPL-MCNC: 4.1 G/DL (ref 3.5–5.2)
ALBUMIN/GLOBULIN RATIO: 1.5 (ref 1–2.5)
ALP BLD-CCNC: 75 U/L (ref 40–129)
ALT SERPL-CCNC: 19 U/L (ref 5–41)
ANION GAP SERPL CALCULATED.3IONS-SCNC: 17 MMOL/L (ref 9–17)
AST SERPL-CCNC: 15 U/L
BASOPHILS # BLD: 1 % (ref 0–2)
BASOPHILS ABSOLUTE: 0.08 K/UL (ref 0–0.2)
BILIRUB SERPL-MCNC: 0.38 MG/DL (ref 0.3–1.2)
BUN BLDV-MCNC: 14 MG/DL (ref 8–23)
BUN/CREAT BLD: ABNORMAL (ref 9–20)
CALCIUM SERPL-MCNC: 9.3 MG/DL (ref 8.6–10.4)
CHLORIDE BLD-SCNC: 107 MMOL/L (ref 98–107)
CHOLESTEROL, FASTING: 118 MG/DL
CHOLESTEROL/HDL RATIO: 3.2
CO2: 22 MMOL/L (ref 20–31)
CREAT SERPL-MCNC: 0.77 MG/DL (ref 0.7–1.2)
DIFFERENTIAL TYPE: ABNORMAL
EOSINOPHILS RELATIVE PERCENT: 7 % (ref 1–4)
ESTIMATED AVERAGE GLUCOSE: 177 MG/DL
GFR AFRICAN AMERICAN: >60 ML/MIN
GFR NON-AFRICAN AMERICAN: >60 ML/MIN
GFR SERPL CREATININE-BSD FRML MDRD: ABNORMAL ML/MIN/{1.73_M2}
GFR SERPL CREATININE-BSD FRML MDRD: ABNORMAL ML/MIN/{1.73_M2}
GLUCOSE BLD-MCNC: 137 MG/DL (ref 70–99)
HBA1C MFR BLD: 7.8 % (ref 4–6)
HCT VFR BLD CALC: 47.6 % (ref 40.7–50.3)
HDLC SERPL-MCNC: 37 MG/DL
HEMOGLOBIN: 14.4 G/DL (ref 13–17)
IMMATURE GRANULOCYTES: 0 %
LDL CHOLESTEROL: 70 MG/DL (ref 0–130)
LYMPHOCYTES # BLD: 22 % (ref 24–43)
MCH RBC QN AUTO: 27.2 PG (ref 25.2–33.5)
MCHC RBC AUTO-ENTMCNC: 30.3 G/DL (ref 28.4–34.8)
MCV RBC AUTO: 90 FL (ref 82.6–102.9)
MONOCYTES # BLD: 10 % (ref 3–12)
NRBC AUTOMATED: 0 PER 100 WBC
PDW BLD-RTO: 14.1 % (ref 11.8–14.4)
PLATELET # BLD: 250 K/UL (ref 138–453)
PLATELET ESTIMATE: ABNORMAL
PMV BLD AUTO: 10.2 FL (ref 8.1–13.5)
POTASSIUM SERPL-SCNC: 4.2 MMOL/L (ref 3.7–5.3)
PROSTATE SPECIFIC ANTIGEN: 1.79 UG/L
RBC # BLD: 5.29 M/UL (ref 4.21–5.77)
RBC # BLD: ABNORMAL 10*6/UL
SEG NEUTROPHILS: 60 % (ref 36–65)
SEGMENTED NEUTROPHILS ABSOLUTE COUNT: 7.46 K/UL (ref 1.5–8.1)
SODIUM BLD-SCNC: 146 MMOL/L (ref 135–144)
TOTAL PROTEIN: 6.8 G/DL (ref 6.4–8.3)
TRIGLYCERIDE, FASTING: 56 MG/DL
TSH SERPL DL<=0.05 MIU/L-ACNC: 0.88 MIU/L (ref 0.3–5)
VITAMIN D 25-HYDROXY: 54.1 NG/ML (ref 30–100)
VLDLC SERPL CALC-MCNC: ABNORMAL MG/DL (ref 1–30)
WBC # BLD: 12.4 K/UL (ref 3.5–11.3)
WBC # BLD: ABNORMAL 10*3/UL

## 2020-07-31 ENCOUNTER — VIRTUAL VISIT (OUTPATIENT)
Dept: NEUROLOGY | Age: 70
End: 2020-07-31
Payer: MEDICARE

## 2020-07-31 PROCEDURE — 99214 OFFICE O/P EST MOD 30 MIN: CPT | Performed by: PSYCHIATRY & NEUROLOGY

## 2020-07-31 RX ORDER — LAMOTRIGINE 25 MG/1
TABLET ORAL
Qty: 240 TABLET | Refills: 3 | Status: SHIPPED | OUTPATIENT
Start: 2020-07-31 | End: 2021-01-24

## 2020-07-31 RX ORDER — FLUOCINONIDE 0.5 MG/G
OINTMENT TOPICAL
COMMUNITY
Start: 2020-07-14 | End: 2020-10-15

## 2020-07-31 NOTE — PROGRESS NOTES
[facility]            [facility], [address]; [facility], [address] [facility]    [address], [address], [place], [address] [facility]            Dept: [phone]          Dept Fax: [phone]             MD Suzan Etienne MD Ahmed B. Leafy Barnes, MD Leretha Conte, MD Mikle Ouch, MD Curly Pomona, CNP               NEUROLOGY FOLLOW UP NOTE                                          PATIENT NAME: Silvia Aragon   PATIENT MRN: [id number]  FOLLOW UP TODAY: 7/31/2020     Dear Dr. Segura 96 PA, PA-C,     I had the pleasure of seeing your patient Pankaj Goode, who comes for follow up. CHIEF COMPLAINT: Follow-up; Cerebrovascular Accident; and Seizures     INITIAL & INTERVAL HISTORY:     Emmanuel Porras is a 77year old RH WM, last seen on 7/29/2019, pt returns for follow up regarding stroke and seizure. Due to Covid-19 pandemic, this follow up was scheduled as virtual visit. Since last visit, no seizures, still on keppra 500mg BID. His last seizure was 8-9 years ago. He only had one. Last EEG was done on 6/16/2018, reported mild left temporal slowing but no epileptform discharges or EEG seizures. He sleeps a lot, he sleeps on his reclinger. He may sleep for about 12 hours a day. He can be mean to wife. He completed 13 PT, now he was approved for another 6 sessions of PT, he just started to resume physical therapy. At home, he does not do anything. He has had frequent urination, recently also wetted himself 1-2 times a week from sleep, per wife, likely due to that he could not get to bathroom on time. He has BPH, wife says he had urologist in the past for his BPH, she will arrange him to establish care again with a urologist. Bowel movement is fine. He still walks with a hemiwalker, when goes out, uses wheelchair. Fasting 07/17/2020 118  <200 mg/dL Final    Comment:    Cholesterol Guidelines:      <200  Desirable   200-240  Borderline      >240  Undesirable         HDL 07/17/2020 37* >40 mg/dL Final    Comment:    HDL Guidelines:    <40     Undesirable   40-59    Borderline    >59     Desirable         LDL Cholesterol 07/17/2020 70  0 - 130 mg/dL Final    Comment:    LDL Guidelines:     <100    Desirable   100-129   Near to/above Desirable   130-159   Borderline      >159   Undesirable     Direct (measured) LDL and calculated LDL are not interchangeable tests.  Chol/HDL Ratio 07/17/2020 3.2  <5 Final            Triglyceride, Fasting 07/17/2020 56  <150 mg/dL Final    Comment:    Triglyceride Guidelines:     <150   Desirable   150-199  Borderline   200-499  High     >499   Very high   Based on AHA Guidelines for fasting triglyceride, October 2012.  VLDL 07/17/2020 NOT REPORTED  1 - 30 mg/dL Final    PSA 07/17/2020 1.79  <4.1 ug/L Final    Comment: The Roche \"ECLIA\" assay is used. Results obtained with different assay methods cannot be   used interchangeably.       TSH 07/17/2020 0.88  0.30 - 5.00 mIU/L Final    Glucose 07/17/2020 137* 70 - 99 mg/dL Final    BUN 07/17/2020 14  8 - 23 mg/dL Final    CREATININE 07/17/2020 0.77  0.70 - 1.20 mg/dL Final    Bun/Cre Ratio 07/17/2020 NOT REPORTED  9 - 20 Final    Calcium 07/17/2020 9.3  8.6 - 10.4 mg/dL Final    Sodium 07/17/2020 146* 135 - 144 mmol/L Final    Potassium 07/17/2020 4.2  3.7 - 5.3 mmol/L Final    Chloride 07/17/2020 107  98 - 107 mmol/L Final    CO2 07/17/2020 22  20 - 31 mmol/L Final    Anion Gap 07/17/2020 17  9 - 17 mmol/L Final    Alkaline Phosphatase 07/17/2020 75  40 - 129 U/L Final    ALT 07/17/2020 19  5 - 41 U/L Final    AST 07/17/2020 15  <40 U/L Final    Total Bilirubin 07/17/2020 0.38  0.3 - 1.2 mg/dL Final    Total Protein 07/17/2020 6.8  6.4 - 8.3 g/dL Final    Alb 07/17/2020 4.1  3.5 - 5.2 g/dL Final    Albumin/Globulin Ratio 07/17/2020 1.5  1.0 - 2.5 Final    GFR Non- 07/17/2020 >60  >60 mL/min Final    GFR  07/17/2020 >60  >60 mL/min Final    GFR Comment 07/17/2020        Final    Comment: Average GFR for 61-76 years old:   80 mL/min/1.73sq m  Chronic Kidney Disease:   <60 mL/min/1.73sq m  Kidney failure:   <15 mL/min/1.73sq m              eGFR calculated using average adult body mass. Additional eGFR calculator available at:        [email or web address]            GFR Staging 07/17/2020 NOT REPORTED   Final    WBC 07/17/2020 12.4* 3.5 - 11.3 k/uL Final    RBC 07/17/2020 5.29  4. 21 - 5.77 m/uL Final    Hemoglobin 07/17/2020 14.4  13.0 - 17.0 g/dL Final    Hematocrit 07/17/2020 47.6  40.7 - 50.3 % Final    MCV 07/17/2020 90.0  82.6 - 102.9 fL Final    MCH 07/17/2020 27.2  25.2 - 33.5 pg Final    MCHC 07/17/2020 30.3  28.4 - 34.8 g/dL Final    RDW 07/17/2020 14.1  11.8 - 14.4 % Final    Platelets 63/27/8965 250  138 - 453 k/uL Final    MPV 07/17/2020 10.2  8.1 - 13.5 fL Final    NRBC Automated 07/17/2020 0.0  0.0 per 100 WBC Final    Differential Type 07/17/2020 NOT REPORTED   Final    Seg Neutrophils 07/17/2020 60  36 - 65 % Final    Lymphocytes 07/17/2020 22* 24 - 43 % Final    Monocytes 07/17/2020 10  3 - 12 % Final    Eosinophils % 07/17/2020 7* 1 - 4 % Final    Basophils 07/17/2020 1  0 - 2 % Final    Immature Granulocytes 07/17/2020 0  0 % Final    Segs Absolute 07/17/2020 7.46  1.50 - 8.10 k/uL Final    Absolute Lymph # 07/17/2020 2.72  1.10 - 3.70 k/uL Final    Absolute Mono # 07/17/2020 1.23* 0.10 - 1.20 k/uL Final    Absolute Eos # 07/17/2020 0.86* 0.00 - 0.44 k/uL Final    Basophils Absolute 07/17/2020 0.08  0.00 - 0.20 k/uL Final    Absolute Immature Granulocyte 07/17/2020 0.05  0.00 - 0.30 k/uL Final    WBC Morphology 07/17/2020 NOT REPORTED   Final    RBC Morphology 07/17/2020 NOT REPORTED   Final    Platelet Estimate 80/02/8768 NOT REPORTED   Final    except those listed in the interval history. Diagnosis Date    BPH (benign prostatic hypertrophy)     CAD (coronary artery disease)     Cyst of neck     Diabetes mellitus (HCC)     DM2 (diabetes mellitus, type 2) ([facility] 7/29/2014    Hyperlipidemia     Hypertension     Seizure ([facility]     Stroke ([facility]         ALLERGIES:   No Known Allergies    MEDICATIONS:   Current Outpatient Medications   Medication Sig Dispense Refill    fluocinonide (LIDEX) 0.05 % ointment APPLY OINTMENT TOPICALLY TWICE DAILY UNDER OCCLUSION FOR 14 DAYS      finasteride (PROSCAR) 5 MG tablet Take 1 tablet by mouth once daily 90 tablet 0    simvastatin (ZOCOR) 40 MG tablet Take 1 tablet by mouth nightly 90 tablet 0    glimepiride (AMARYL) 4 MG tablet Take 1 tablet by mouth every morning 30 tablet 3    lisinopril (PRINIVIL;ZESTRIL) 10 MG tablet Take 1 tablet by mouth once daily 90 tablet 0    levETIRAcetam (KEPPRA) 250 MG tablet Take 2 tablets by mouth twice daily 120 tablet 3    tamsulosin (FLOMAX) 0.4 MG capsule TAKE 1 CAPSULE BY MOUTH ONCE DAILY 90 capsule 1    carvedilol (COREG) 3.125 MG tablet TAKE ONE TABLET BY MOUTH TWICE DAILY 180 tablet 3    clopidogrel (PLAVIX) 75 MG tablet TAKE ONE TABLET BY MOUTH ONCE DAILY 90 tablet 3    Cholecalciferol (VITAMIN D3) 1000 units CAPS Take 25 mcg by mouth 2 times daily       No current facility-administered medications for this visit.         LABS & TESTS:      Lab Results   Component Value Date    WBC 12.4 (H) 07/17/2020    HGB 14.4 07/17/2020    HCT 47.6 07/17/2020    MCV 90.0 07/17/2020     07/17/2020       REVIEW OF SYSTEMS:      CONSTITUTIONAL Weight change: absent, Appetite change: absent, Fatigue: absent    HEENT Ears: normal, Visual disturbance: absent    RESPIRATORY Shortness of breath: absent, Cough: absent    CARDIOVASCULAR Chest pain: absent, Leg swelling :absent    GI Constipation: absent, Diarrhea: absent, Swallowing change: absent     continue stroke secondary prevention with optimal BP, BG, LDL control     // Wet bed  - incontinence vs urinary urgency?  - follow with urology  - also start lamictal       >50% of 25 minute virtual face to face time spent counseling patient, multiple issues discussed, all questions answered. This is a virtual Visit with patient's consent and align with [place] policy to reduce the patient's risk of exposure to COVID-19 and provide continuity of care for an established/new patient. The patient was at home and provider in clinic for this encounter. I discussed with the patient the nature of our telehealth visits via interactive/real-time audio/video that:    - Diagnosis and treatments are based on my evaluation from virtual encounter  - Virtual sessions are not being recorded and that personal health information is protected  - Our clinic staff will provide follow up care in person if/when the patient needs it.     RTC in 8-10 weeks      Tia Connolly MD, MS

## 2020-07-31 NOTE — PATIENT INSTRUCTIONS
1. Continue keppra at 500mg twice a day for now  2. Start lamictal as following  Week 1: 25mg daily  Week 2: 50mg daily  Week 3: 75mg daily  Week 4: 100mg daily  Week 5: 50mg morning, 100mg daily  From week 6: 100mg twice a day  3. From week 7: decrease keppra to 250mg morning, 500mg evening  4. Follow with urology for bed wetting.    5. Seizure precaution     Return in 8-10 weeks

## 2020-08-24 RX ORDER — TAMSULOSIN HYDROCHLORIDE 0.4 MG/1
CAPSULE ORAL
Qty: 90 CAPSULE | Refills: 0 | Status: SHIPPED | OUTPATIENT
Start: 2020-08-24 | End: 2020-11-09

## 2020-08-24 RX ORDER — LISINOPRIL 10 MG/1
TABLET ORAL
Qty: 90 TABLET | Refills: 0 | Status: SHIPPED | OUTPATIENT
Start: 2020-08-24 | End: 2020-12-18

## 2020-08-26 ENCOUNTER — TELEPHONE (OUTPATIENT)
Dept: NEUROLOGY | Age: 70
End: 2020-08-26

## 2020-08-26 NOTE — TELEPHONE ENCOUNTER
Patient's wife called the office this morning. She stated that Jose did  the new prescription. However he has decided that he does not want to use it after reading about it and the possible side effects. Please advise.

## 2020-08-28 NOTE — TELEPHONE ENCOUNTER
Call placed back to patient's wife Svetlana Finch and this information was given. Wife wanted to reschedule his appointment for October and was transferred to the appointment line.

## 2020-09-03 NOTE — TELEPHONE ENCOUNTER
Pharmacy requesting refill of Keppra 250 mg.      Medication active on med list yes      Date last ordered: 4/24/2020  verified on 9/3/2020  verified by CLEO Martin LPN      Date of last appointment 7/31/2020    Next Visit Date: 1/21/2021

## 2020-09-04 RX ORDER — LEVETIRACETAM 250 MG/1
TABLET ORAL
Qty: 120 TABLET | Refills: 4 | Status: SHIPPED | OUTPATIENT
Start: 2020-09-04 | End: 2021-02-15

## 2020-09-25 NOTE — TELEPHONE ENCOUNTER
Next Visit Date:  Future Appointments   Date Time Provider Rose Jennifer   10/12/2020 10:45 AM Yolanda ALLEN [facility]   1/21/2021 10:40 AM Amy Yu MD Neuro Spec [address] Maintenance   Topic Date Due    Colon cancer screen colonoscopy  11/12/2001    Diabetic microalbuminuria test  10/27/2016    Pneumococcal 65+ years Vaccine (1 of 1 - PPSV23) 11/12/2016    Diabetic retinal exam  04/24/2018    Flu vaccine (1) 09/01/2020    DTaP/Tdap/Td vaccine (1 - Tdap) 06/15/2021 (Originally 11/12/1970)    Shingles Vaccine (1 of 2) 06/15/2021 (Originally 11/12/2001)    Hepatitis C screen  06/15/2021 (Originally 1950)    Annual Wellness Visit (AWV)  01/27/2021    Diabetic foot exam  06/15/2021    Statin Therapy  06/18/2021    A1C test (Diabetic or Prediabetic)  07/17/2021    Lipid screen  07/17/2021    Potassium monitoring  07/17/2021    Creatinine monitoring  07/17/2021    AAA screen  Completed    Hepatitis A vaccine  Aged Out    Hib vaccine  Aged Out    Meningococcal (ACWY) vaccine  Aged Out       Hemoglobin A1C (%)   Date Value   07/17/2020 7.8 (H)   12/13/2019 7.8   05/01/2019 8.3             ( goal A1C is < 7)   No results found for: LABMICR  LDL Cholesterol (mg/dL)   Date Value   07/17/2020 70   07/02/2019 57     LDL Calculated (mg/dL)   Date Value   04/10/2017 60.2       (goal LDL is <100)   AST (U/L)   Date Value   07/17/2020 15     ALT (U/L)   Date Value   07/17/2020 19     BUN (mg/dL)   Date Value   07/17/2020 14     BP Readings from Last 3 Encounters:   06/15/20 118/60   01/27/20 111/68   12/13/19 120/68          (goal 120/80)    All Future Testing planned in [facility]  Lab Frequency Next Occurrence   Microalbumin, Ur Once 07/15/2020               Patient Active Problem List:     Ischemic stroke of frontal lobe (HCC)     Generalized seizure disorder (HCC)     DM2 (diabetes mellitus, type 2) ([facility]     Hyperlipidemia     Benign prostatic hyperplasia without lower urinary tract symptoms     Coronary artery disease involving native heart without angina pectoris

## 2020-09-26 RX ORDER — FINASTERIDE 5 MG/1
TABLET, FILM COATED ORAL
Qty: 90 TABLET | Refills: 0 | Status: SHIPPED | OUTPATIENT
Start: 2020-09-26 | End: 2021-01-11

## 2020-09-26 RX ORDER — SIMVASTATIN 40 MG
40 TABLET ORAL NIGHTLY
Qty: 90 TABLET | Refills: 0 | Status: SHIPPED | OUTPATIENT
Start: 2020-09-26 | End: 2021-01-11

## 2020-10-01 RX ORDER — GLIMEPIRIDE 4 MG/1
TABLET ORAL
Qty: 30 TABLET | Refills: 0 | Status: SHIPPED | OUTPATIENT
Start: 2020-10-01 | End: 2020-11-09

## 2020-10-15 ENCOUNTER — OFFICE VISIT (OUTPATIENT)
Dept: FAMILY MEDICINE CLINIC | Age: 70
End: 2020-10-15
Payer: MEDICARE

## 2020-10-15 VITALS
HEART RATE: 61 BPM | RESPIRATION RATE: 16 BRPM | DIASTOLIC BLOOD PRESSURE: 72 MMHG | TEMPERATURE: 97 F | SYSTOLIC BLOOD PRESSURE: 126 MMHG

## 2020-10-15 LAB — HBA1C MFR BLD: 7.3 %

## 2020-10-15 PROCEDURE — 3051F HG A1C>EQUAL 7.0%<8.0%: CPT | Performed by: PHYSICIAN ASSISTANT

## 2020-10-15 PROCEDURE — 99213 OFFICE O/P EST LOW 20 MIN: CPT | Performed by: PHYSICIAN ASSISTANT

## 2020-10-15 PROCEDURE — 83036 HEMOGLOBIN GLYCOSYLATED A1C: CPT | Performed by: PHYSICIAN ASSISTANT

## 2020-10-15 NOTE — PROGRESS NOTES
affected area once daily 113 g 0    glimepiride (AMARYL) 4 MG tablet TAKE 1 TABLET BY MOUTH ONCE DAILY IN THE MORNING 30 tablet 0    simvastatin (ZOCOR) 40 MG tablet Take 1 tablet by mouth nightly 90 tablet 0    finasteride (PROSCAR) 5 MG tablet Take 1 tablet by mouth once daily 90 tablet 0    levETIRAcetam (KEPPRA) 250 MG tablet Take 2 tablets by mouth twice daily 120 tablet 4    lisinopril (PRINIVIL;ZESTRIL) 10 MG tablet Take 1 tablet by mouth once daily 90 tablet 0    tamsulosin (FLOMAX) 0.4 MG capsule Take 1 capsule by mouth once daily 90 capsule 0    lamoTRIgine (LAMICTAL) 25 MG tablet 25mg daily for 1wk, 50mg daily for 1wk, 75mg daily for 1wk, 100mg daily for 1wk, then 50mg AM, 100mg PM for 1wk, then 100mg  tablet 3    carvedilol (COREG) 3.125 MG tablet TAKE ONE TABLET BY MOUTH TWICE DAILY 180 tablet 3    clopidogrel (PLAVIX) 75 MG tablet TAKE ONE TABLET BY MOUTH ONCE DAILY 90 tablet 3    Cholecalciferol (VITAMIN D3) 1000 units CAPS Take 25 mcg by mouth 2 times daily       No current facility-administered medications for this visit. Social History     Tobacco Use    Smoking status: Former Smoker     Packs/day: 1.00     Years: 10.00     Pack years: 10.00     Start date: 1997     Last attempt to quit: 2007     Years since quittin.5    Smokeless tobacco: Never Used   Substance Use Topics    Alcohol use: Not Currently     Alcohol/week: 0.0 standard drinks     Comment: occasional    Drug use: No       Family History   Problem Relation Age of Onset    Heart Disease Father         Review of Systems   Constitutional: Negative for appetite change, chills, diaphoresis, fatigue, fever and unexpected weight change. Respiratory: Negative for cough, chest tightness, shortness of breath and wheezing. Cardiovascular: Negative for chest pain, palpitations and leg swelling.    Gastrointestinal: Negative for abdominal pain, blood in stool, constipation, diarrhea, nausea and vomiting. Genitourinary: Negative for dysuria, frequency and urgency. Musculoskeletal: Positive for gait problem. Negative for arthralgias, back pain, joint swelling, myalgias, neck pain and neck stiffness. Skin: Positive for wound. Negative for color change, pallor and rash. Neurological: Negative for dizziness, syncope, weakness, light-headedness and headaches. Physical Exam  Vitals signs and nursing note reviewed. Constitutional:       General: He is not in acute distress. Appearance: Normal appearance. He is not ill-appearing, toxic-appearing or diaphoretic. Cardiovascular:      Rate and Rhythm: Normal rate and regular rhythm. Heart sounds: Normal heart sounds. Pulmonary:      Effort: Pulmonary effort is normal.      Breath sounds: Normal breath sounds. Abdominal:      General: Abdomen is flat. Bowel sounds are normal.      Palpations: Abdomen is soft. Neurological:      General: No focal deficit present. Mental Status: He is alert.    Psychiatric:         Mood and Affect: Mood normal.           Vitals:    10/15/20 1042   BP: 126/72   Site: Left Upper Arm   Position: Sitting   Cuff Size: Medium Adult   Pulse: 61   Resp: 16   Temp: 97 °F (36.1 °C)   TempSrc: Temporal     BP Readings from Last 3 Encounters:   10/15/20 126/72   06/15/20 118/60   01/27/20 111/68              DIAGNOSTIC FINDINGS:  CBC:  Lab Results   Component Value Date    WBC 12.4 07/17/2020    HGB 14.4 07/17/2020     07/17/2020       BMP:    Lab Results   Component Value Date     07/17/2020    K 4.2 07/17/2020     07/17/2020    CO2 22 07/17/2020    BUN 14 07/17/2020    CREATININE 0.77 07/17/2020    GLUCOSE 137 07/17/2020         FASTING LIPID PANEL:  Lab Results   Component Value Date    CHOL 125 04/10/2017    HDL 37 (L) 07/17/2020    TRIG 139 04/10/2017       Results for orders placed or performed in visit on 10/15/20   POCT glycosylated hemoglobin (Hb A1C)   Result Value Ref Range Hemoglobin A1C 7.3 %         ASSESSMENT AND PLAN:   Diagnosis Orders   1. Need for prophylactic vaccination against Streptococcus pneumoniae (pneumococcus)     2. At high risk for falls     3. Type 2 diabetes mellitus with hypoglycemia without coma, without long-term current use of insulin (HCC)  Microalbumin, Ur    Creatinine, Random Urine    POCT glycosylated hemoglobin (Hb A1C)   4. Skin ulcer of sacrum, unspecified ulcer stage [facility])  Samantha Cooper MD, Plastic Surgery, [address]       FOLLOW UP AND INSTRUCTIONS:  · Return in about 3 months (around 1/15/2021), or if symptoms worsen or fail to improve. · Discussed use, benefit, and side effects of prescribed medications. Barriers to medication compliance addressed. All patient questions answered. Pt voiced understanding. · Patient instructed to return to the office if symptoms do not resolve or go directly to the ER if the symptoms worsen - patient voiced understanding. · Patient given educational materials - see patient instructions    [place] [address] [facility]  10/18/2020, 3:42 PM    This note is created with the assistance of a speech-recognition program. While intending to generate a document that actually reflects the content of the visit, the document can still have some mistakes which may not have been identified and corrected by editing. On the basis of positive falls risk screening, assessment and plan is as follows: home safety tips provided.

## 2020-10-18 ASSESSMENT — ENCOUNTER SYMPTOMS
BLOOD IN STOOL: 0
NAUSEA: 0
CHEST TIGHTNESS: 0
WHEEZING: 0
COLOR CHANGE: 0
VOMITING: 0
CONSTIPATION: 0
BACK PAIN: 0
COUGH: 0
ABDOMINAL PAIN: 0
SHORTNESS OF BREATH: 0
DIARRHEA: 0

## 2020-10-19 ENCOUNTER — TELEPHONE (OUTPATIENT)
Dept: FAMILY MEDICINE CLINIC | Age: 70
End: 2020-10-19

## 2020-10-19 NOTE — TELEPHONE ENCOUNTER
Faxed request from [employer] stating \"this product is unavailable. Can you send over a new rx? Thanks\" this is for secura zinc oxide. Please advise thank you!          Next Visit Date:  Future Appointments   Date Time Provider Rose Rodriguez   1/15/2021 10:00 AM Jenna Paul PC [facility]   1/21/2021 10:40 AM Harsha Ferrari MD Neuro Spec [address] Maintenance   Topic Date Due    Colon cancer screen colonoscopy  11/12/2001    Diabetic microalbuminuria test  10/27/2016    Pneumococcal 65+ years Vaccine (1 of 1 - PPSV23) 11/12/2016    Diabetic retinal exam  04/24/2018    Flu vaccine (1) 09/01/2020    DTaP/Tdap/Td vaccine (1 - Tdap) 06/15/2021 (Originally 11/12/1970)    Shingles Vaccine (1 of 2) 06/15/2021 (Originally 11/12/2001)    Hepatitis C screen  06/15/2021 (Originally 1950)    Annual Wellness Visit (AWV)  01/27/2021    Diabetic foot exam  06/15/2021    Lipid screen  07/17/2021    Potassium monitoring  07/17/2021    Creatinine monitoring  07/17/2021    A1C test (Diabetic or Prediabetic)  10/15/2021    AAA screen  Completed    Hepatitis A vaccine  Aged Out    Hib vaccine  Aged Out    Meningococcal (ACWY) vaccine  Aged Out       Hemoglobin A1C (%)   Date Value   10/15/2020 7.3   07/17/2020 7.8 (H)   12/13/2019 7.8             ( goal A1C is < 7)   No results found for: LABMICR  LDL Cholesterol (mg/dL)   Date Value   07/17/2020 70   07/02/2019 57     LDL Calculated (mg/dL)   Date Value   04/10/2017 60.2       (goal LDL is <100)   AST (U/L)   Date Value   07/17/2020 15     ALT (U/L)   Date Value   07/17/2020 19     BUN (mg/dL)   Date Value   07/17/2020 14     BP Readings from Last 3 Encounters:   10/15/20 126/72   06/15/20 118/60   01/27/20 111/68          (goal 120/80)    All Future Testing planned in [facility]  Lab Frequency Next Occurrence   Microalbumin, Ur Once 07/15/2020   Microalbumin, Ur Once 11/14/2020   Creatinine, Random Urine Once 11/14/2020               Patient Active Problem List:     Ischemic stroke of frontal lobe (HCC)     Generalized seizure disorder (HCC)     DM2 (diabetes mellitus, type 2) (HCC)     Hyperlipidemia     Benign prostatic hyperplasia without lower urinary tract symptoms     Coronary artery disease involving native heart without angina pectoris

## 2020-10-29 ENCOUNTER — TELEPHONE (OUTPATIENT)
Dept: SURGERY | Age: 70
End: 2020-10-29

## 2020-11-09 RX ORDER — TAMSULOSIN HYDROCHLORIDE 0.4 MG/1
CAPSULE ORAL
Qty: 90 CAPSULE | Refills: 0 | Status: SHIPPED | OUTPATIENT
Start: 2020-11-09 | End: 2021-02-15 | Stop reason: SDUPTHER

## 2020-11-09 RX ORDER — GLIMEPIRIDE 4 MG/1
TABLET ORAL
Qty: 30 TABLET | Refills: 0 | Status: SHIPPED | OUTPATIENT
Start: 2020-11-09 | End: 2020-12-18

## 2020-11-09 NOTE — TELEPHONE ENCOUNTER
Hyperlipidemia     Benign prostatic hyperplasia without lower urinary tract symptoms     Coronary artery disease involving native heart without angina pectoris

## 2020-11-09 NOTE — TELEPHONE ENCOUNTER
Next Visit Date:  Future Appointments   Date Time Provider Rose Leai   1/15/2021 10:00 AM Mehul Paul  [facility]   1/21/2021 10:40 AM Lesa Rashid MD Neuro Spec [address] Maintenance   Topic Date Due    Colon cancer screen colonoscopy  11/12/2001    Diabetic microalbuminuria test  10/27/2016    Pneumococcal 65+ years Vaccine (1 of 1 - PPSV23) 11/12/2016    Diabetic retinal exam  04/24/2018    Flu vaccine (1) 09/01/2020    DTaP/Tdap/Td vaccine (1 - Tdap) 06/15/2021 (Originally 11/12/1970)    Shingles Vaccine (1 of 2) 06/15/2021 (Originally 11/12/2001)    Hepatitis C screen  06/15/2021 (Originally 1950)    Annual Wellness Visit (AWV)  01/27/2021    Diabetic foot exam  06/15/2021    Lipid screen  07/17/2021    Potassium monitoring  07/17/2021    Creatinine monitoring  07/17/2021    A1C test (Diabetic or Prediabetic)  10/15/2021    AAA screen  Completed    Hepatitis A vaccine  Aged Out    Hib vaccine  Aged Out    Meningococcal (ACWY) vaccine  Aged Out       Hemoglobin A1C (%)   Date Value   10/15/2020 7.3   07/17/2020 7.8 (H)   12/13/2019 7.8             ( goal A1C is < 7)   No results found for: LABMICR  LDL Cholesterol (mg/dL)   Date Value   07/17/2020 70   07/02/2019 57     LDL Calculated (mg/dL)   Date Value   04/10/2017 60.2       (goal LDL is <100)   AST (U/L)   Date Value   07/17/2020 15     ALT (U/L)   Date Value   07/17/2020 19     BUN (mg/dL)   Date Value   07/17/2020 14     BP Readings from Last 3 Encounters:   10/15/20 126/72   06/15/20 118/60   01/27/20 111/68          (goal 120/80)    All Future Testing planned in [facility]  Lab Frequency Next Occurrence   Microalbumin, Ur Once 07/15/2020   Microalbumin, Ur Once 11/14/2020   Creatinine, Random Urine Once 11/14/2020               Patient Active Problem List:     Ischemic stroke of frontal lobe (HCC)     Generalized seizure disorder ([address]     DM2 (diabetes mellitus, type 2) ([facility] Hyperlipidemia     Benign prostatic hyperplasia without lower urinary tract symptoms     Coronary artery disease involving native heart without angina pectoris

## 2020-12-18 RX ORDER — GLIMEPIRIDE 4 MG/1
TABLET ORAL
Qty: 30 TABLET | Refills: 3 | Status: SHIPPED | OUTPATIENT
Start: 2020-12-18

## 2020-12-18 RX ORDER — LISINOPRIL 10 MG/1
TABLET ORAL
Qty: 90 TABLET | Refills: 0 | Status: SHIPPED | OUTPATIENT
Start: 2020-12-18 | End: 2021-03-12 | Stop reason: SDUPTHER

## 2020-12-18 NOTE — TELEPHONE ENCOUNTER
Last visit: 10/15/20  Last Med refill: 11/9/20  Does patient have enough medication for 72 hours: Yes    Next Visit Date:  Future Appointments   Date Time Provider Rose Rodriguez   1/15/2021 10:00 AM Brandy ALLEN [facility]   1/21/2021 10:40 AM Sara Felix MD [facility] [address] Maintenance   Topic Date Due    Colon cancer screen colonoscopy  11/12/2001    Diabetic microalbuminuria test  10/27/2016    Pneumococcal 65+ years Vaccine (1 of 1 - PPSV23) 11/12/2016    Diabetic retinal exam  04/24/2018    Flu vaccine (1) 09/01/2020    DTaP/Tdap/Td vaccine (1 - Tdap) 06/15/2021 (Originally 11/12/1970)    Shingles Vaccine (1 of 2) 06/15/2021 (Originally 11/12/2001)    Hepatitis C screen  06/15/2021 (Originally 1950)    Annual Wellness Visit (AWV)  01/27/2021    Diabetic foot exam  06/15/2021    Lipid screen  07/17/2021    Potassium monitoring  07/17/2021    Creatinine monitoring  07/17/2021    A1C test (Diabetic or Prediabetic)  10/15/2021    AAA screen  Completed    Hepatitis A vaccine  Aged Out    Hib vaccine  Aged Out    Meningococcal (ACWY) vaccine  Aged Out       Hemoglobin A1C (%)   Date Value   10/15/2020 7.3   07/17/2020 7.8 (H)   12/13/2019 7.8             ( goal A1C is < 7)   No results found for: LABMICR  LDL Cholesterol (mg/dL)   Date Value   07/17/2020 70   07/02/2019 57     LDL Calculated (mg/dL)   Date Value   04/10/2017 60.2       (goal LDL is <100)   AST (U/L)   Date Value   07/17/2020 15     ALT (U/L)   Date Value   07/17/2020 19     BUN (mg/dL)   Date Value   07/17/2020 14     BP Readings from Last 3 Encounters:   10/15/20 126/72   06/15/20 118/60   01/27/20 111/68          (goal 120/80)    All Future Testing planned in [facility]  Lab Frequency Next Occurrence   Microalbumin, Ur Once 07/15/2020   Microalbumin, Ur Once 11/14/2020   Creatinine, Random Urine Once 11/14/2020               Patient Active Problem List:     Ischemic stroke of frontal lobe ([facility] [address]     [facility]ized [facility] disorder ([facility]     DM2 (diabetes mellitus, type 2) ([facility]     Hyperlipidemia     Benign prostatic hyperplasia without lower urinary tract symptoms     Coronary artery disease involving native heart without angina pectoris

## 2020-12-18 NOTE — TELEPHONE ENCOUNTER
Electronic medication refill request. Pharmacy on file. Please advise.           Next Visit Date:  Future Appointments   Date Time Provider Rose Rodriguez   1/15/2021 10:00 AM Katelynn Paul  [facility]   1/21/2021 10:40 AM Palmira Chapman MD [facility] [address] Maintenance   Topic Date Due    Colon cancer screen colonoscopy  11/12/2001    Diabetic microalbuminuria test  10/27/2016    Pneumococcal 65+ years Vaccine (1 of 1 - PPSV23) 11/12/2016    Diabetic retinal exam  04/24/2018    Flu vaccine (1) 09/01/2020    DTaP/Tdap/Td vaccine (1 - Tdap) 06/15/2021 (Originally 11/12/1970)    Shingles Vaccine (1 of 2) 06/15/2021 (Originally 11/12/2001)    Hepatitis C screen  06/15/2021 (Originally 1950)    Annual Wellness Visit (AWV)  01/27/2021    Diabetic foot exam  06/15/2021    Lipid screen  07/17/2021    Potassium monitoring  07/17/2021    Creatinine monitoring  07/17/2021    A1C test (Diabetic or Prediabetic)  10/15/2021    AAA screen  Completed    Hepatitis A vaccine  Aged Out    Hib vaccine  Aged Out    Meningococcal (ACWY) vaccine  Aged Out       Hemoglobin A1C (%)   Date Value   10/15/2020 7.3   07/17/2020 7.8 (H)   12/13/2019 7.8             ( goal A1C is < 7)   No results found for: LABMICR  LDL Cholesterol (mg/dL)   Date Value   07/17/2020 70   07/02/2019 57     LDL Calculated (mg/dL)   Date Value   04/10/2017 60.2       (goal LDL is <100)   AST (U/L)   Date Value   07/17/2020 15     ALT (U/L)   Date Value   07/17/2020 19     BUN (mg/dL)   Date Value   07/17/2020 14     BP Readings from Last 3 Encounters:   10/15/20 126/72   06/15/20 118/60   01/27/20 111/68          (goal 120/80)    All Future Testing planned in [facility]  Lab Frequency Next Occurrence   Microalbumin, Ur Once 07/15/2020   Microalbumin, Ur Once 11/14/2020   Creatinine, Random Urine Once 11/14/2020               Patient Active Problem List:     Ischemic stroke of frontal lobe (HCC)     Generalized [facility] disorder ([facility]     DM2 (diabetes mellitus, type 2) (HCC)     Hyperlipidemia     Benign prostatic hyperplasia without lower urinary tract symptoms     Coronary artery disease involving native heart without angina pectoris

## 2021-01-10 DIAGNOSIS — N40.0 BENIGN NON-NODULAR PROSTATIC HYPERPLASIA WITHOUT LOWER URINARY TRACT SYMPTOMS: ICD-10-CM

## 2021-01-11 RX ORDER — FINASTERIDE 5 MG/1
TABLET, FILM COATED ORAL
Qty: 30 TABLET | Refills: 0 | Status: SHIPPED | OUTPATIENT
Start: 2021-01-11 | End: 2021-02-15 | Stop reason: SDUPTHER

## 2021-01-11 RX ORDER — SIMVASTATIN 40 MG
40 TABLET ORAL NIGHTLY
Qty: 30 TABLET | Refills: 0 | Status: SHIPPED | OUTPATIENT
Start: 2021-01-11 | End: 2021-02-15 | Stop reason: SDUPTHER

## 2021-01-11 RX ORDER — CLOPIDOGREL BISULFATE 75 MG/1
TABLET ORAL
Qty: 30 TABLET | Refills: 0 | Status: SHIPPED | OUTPATIENT
Start: 2021-01-11 | End: 2021-02-15 | Stop reason: SDUPTHER

## 2021-01-11 RX ORDER — CARVEDILOL 3.12 MG/1
TABLET ORAL
Qty: 30 TABLET | Refills: 0 | Status: SHIPPED | OUTPATIENT
Start: 2021-01-11 | End: 2021-02-15 | Stop reason: SDUPTHER

## 2021-01-11 NOTE — TELEPHONE ENCOUNTER
Left detailed message to schedule a FU appt     Next Visit Date:  Future Appointments   Date Time Provider Rose Leai   1/21/2021 10:40 AM Hammad Orta MD [facility] [address] Maintenance   Topic Date Due    Colon cancer screen colonoscopy  11/12/2001    Diabetic microalbuminuria test  10/27/2016    Pneumococcal 65+ years Vaccine (1 of 1 - PPSV23) 11/12/2016    Diabetic retinal exam  04/24/2018    Flu vaccine (1) 09/01/2020    Annual Wellness Visit (AWV)  01/27/2021    DTaP/Tdap/Td vaccine (1 - Tdap) 06/15/2021 (Originally 11/12/1970)    Shingles Vaccine (1 of 2) 06/15/2021 (Originally 11/12/2001)    Hepatitis C screen  06/15/2021 (Originally 1950)    Diabetic foot exam  06/15/2021    Lipid screen  07/17/2021    A1C test (Diabetic or Prediabetic)  10/15/2021    Potassium monitoring  01/04/2022    Creatinine monitoring  01/04/2022    AAA screen  Completed    Hepatitis A vaccine  Aged Out    Hib vaccine  Aged Out    Meningococcal (ACWY) vaccine  Aged Out       Hemoglobin A1C (%)   Date Value   10/15/2020 7.3   07/17/2020 7.8 (H)   12/13/2019 7.8             ( goal A1C is < 7)   No results found for: LABMICR  LDL Cholesterol (mg/dL)   Date Value   07/17/2020 70   07/02/2019 57     LDL Calculated (mg/dL)   Date Value   04/10/2017 60.2       (goal LDL is <100)   AST (U/L)   Date Value   07/17/2020 15     ALT (U/L)   Date Value   07/17/2020 19     BUN (mg/dL)   Date Value   01/04/2021 14     BP Readings from Last 3 Encounters:   10/15/20 126/72   06/15/20 118/60   01/27/20 111/68          (goal 120/80)    All Future Testing planned in [facility]  Lab Frequency Next Occurrence   Microalbumin, Ur Once 07/15/2020   Microalbumin, Ur Once 11/14/2020   Creatinine, Random Urine Once 11/14/2020               Patient Active Problem List:     Ischemic stroke of frontal lobe (HCC)     Generalized seizure disorder ([facility] [address]     DM2 (diabetes mellitus, type 2) (HCC)     Hyperlipidemia     Benign prostatic hyperplasia without lower urinary tract symptoms     Coronary artery disease involving native heart without angina pectoris

## 2021-01-18 RX ORDER — ASPIRIN 81 MG/1
81 TABLET ORAL DAILY
COMMUNITY

## 2021-01-18 NOTE — PROGRESS NOTES
[facility]            [facility], [address]; [facility], [address] [facility]  [address], [address], [facility], [address]    [address] [facility], [address] [place], [address] [facility]            Dept: [phone]          Dept Fax: [phone]             MD Hai Banegas MD Ahmed B. Raquel Flores, MD Flavio Birks, MD Seth Castillo, MD Ryann Johnson, CNP               NEUROLOGY FOLLOW UP NOTE                                          PATIENT NAME: Luz Aragon   PATIENT MRN: [id number]  FOLLOW UP TODAY: 1/18/2021     Dear Dr. Phill GARCIA, PA-C,     I had the pleasure of seeing your patient Manan Alvarado, who comes for follow up. CHIEF COMPLAINT: Cerebrovascular Accident     INITIAL & INTERVAL HISTORY:   Rajwinder Huggins is a 72year old RH WM, last seen on 7/31/2020, pt returns for follow up regarding stroke and seizure. Due to Covid-19 pandemic, this follow up was scheduled as virtual visit. He has grade 3 ulcer on left great toe which had been presented for months. It was thought to be related to diabetes, it would not heal, he went to food doctor but still not help. On Dec, 10 2020, he was hospitalized at [facility], underwent vascular surgery. then discharged to home on 12/18/2020,  he has been treating with antibiotics via PICC line. He has had visiting nurse to his home, His last lab was on 1/18/2021, which showed ESR 28 (0-20); WBC 10.8, EOS 1.2% (0.0--0.4); A1c 6.3, in the past he had 8.3. On 1/11, CRP <0.05. In addition to plavix and aspirin. Eliquis was given by vascular surgery about one month ago. His follow up with vascular will be in 2 weeks. Since last visit, no seizures, still on keppra 500mg BID. His last seizure was 8-9 years ago.  He only had one. last visit prescribed him lamictal but after read the side effects, he decided not to start it. Last EEG was done on 6/16/2018, reported mild left temporal slowing but no epileptform discharges or EEG seizures. He has had no recurrent strokes. AEDs currently  Keppra 500mg BID    AEDs tried  Lamictal     Initial visit on 5/10/2018  Stroke   Time: 11 years ago   Features: wife recalls pt stuttering, right arm/leg weakness, with residual right side weakness, also intermittent right face pain, intermittently, once a week, duration not sure, helped with increased keppra dose. Risk factors: HTN, remote smoker, quit since stroke, occasional drinking. Medications: Plavix      Seizures   Onset: 5-6 years ago  Aura/warning signs: none  Features: he was home, drooling, \"out of it\", not sure of the duration, only once. Risk factors: history of stroke, no family history of seizure. No history of head injury. Social: no depression/anxiety, sleep so so. Other medical issues: right facial pain, Dr. Celena Barrow increased his Keppra dose, since then, resolved; action slower recent years, both legs and left hand tremor. Medications: keppra 500mg BID, no side effects     NEUROLOGICAL TESTS  No head imaging     EEG 6/2018  This EEG shows the presence of mild left hemispheric temporal  slowing.  This EEG is concordant with potential focal structural process  over this area.  No clear epileptiform disturbance is seen in this  recording.     7/2019  Vitamin D 25  44  A1c 8.1-> 8.0 (7/26/2018)-> 8.3 (5/2019)     PMH/PSH/SH/FMH: Remain unchanged since last visit except those listed in the interval history.        Diagnosis Date    BPH (benign prostatic hypertrophy)     CAD (coronary artery disease)     Cyst of neck     Diabetes mellitus ([facility] [address] [facility]     DM2 (diabetes mellitus, type 2) ([facility] 7/29/2014    Hyperlipidemia     Hypertension     Seizure ([facility]     Stroke ([facility]         ALLERGIES:   No Known Allergies    MEDICATIONS:   Current Outpatient Medications   Medication Sig Dispense Refill    finasteride (PROSCAR) 5 MG tablet Take 1 tablet by mouth once daily 30 tablet 0    clopidogrel (PLAVIX) 75 MG tablet Take 1 tablet by mouth once daily 30 tablet 0    carvedilol (COREG) 3.125 MG tablet Take 1 tablet by mouth twice daily 30 tablet 0    simvastatin (ZOCOR) 40 MG tablet Take 1 tablet by mouth nightly 30 tablet 0    lisinopril (PRINIVIL;ZESTRIL) 10 MG tablet Take 1 tablet by mouth once daily 90 tablet 0    glimepiride (AMARYL) 4 MG tablet TAKE 1 TABLET BY MOUTH ONCE DAILY IN THE MORNING 30 tablet 3    tamsulosin (FLOMAX) 0.4 MG capsule Take 1 capsule by mouth once daily 90 capsule 0    levETIRAcetam (KEPPRA) 250 MG tablet Take 2 tablets by mouth twice daily 120 tablet 4    Cholecalciferol (VITAMIN D3) 1000 units CAPS Take 25 mcg by mouth 2 times daily      ZINC OXIDE, TOPICAL, 10 % CREA Apply to the affected area once daily (Patient not taking: Reported on 1/18/2021) 113 g 0    lamoTRIgine (LAMICTAL) 25 MG tablet 25mg daily for 1wk, 50mg daily for 1wk, 75mg daily for 1wk, 100mg daily for 1wk, then 50mg AM, 100mg PM for 1wk, then 100mg BID (Patient not taking: Reported on 1/18/2021) 240 tablet 3     No current facility-administered medications for this visit. LABS & TESTS:      Lab Results   Component Value Date    WBC 10.3 01/11/2021    HGB 12.9 (L) 01/11/2021    HCT 40.4 (L) 01/11/2021    MCV 85.8 01/11/2021    .5 01/11/2021       REVIEW OF SYSTEMS:       All items selected indicate a positive finding. Those items not selected are negative.   Constitutional [] Weight loss/gain   [] Fatigue  [] Fever/Chills   HEENT [] Hearing Loss  [] Visual Disturbance  [] Tinnitus  [] Eye pain   Respiratory [] Shortness of Breath  [] Cough  [] Snoring   Cardiovascular [] Chest Pain  [] Palpitations  [] Lightheaded   GI [] Constipation  [] Diarrhea  [] Swallowing change  [] Nausea/vomiting    [] Urinary Frequency  [] Urinary Urgency   Musculoskeletal [] Neck pain  [] Back pain  [] Muscle pain  [] Restless legs   Dermatologic [] Skin changes   Neurologic [] Memory loss/confusion  [] Seizures  [x] Trouble walking or imbalance  [] Dizziness  [] Sleep disturbance  [x] Weakness  [] Numbness  [] Tremors  [x] Speech Difficulty  [] Headaches  [] Light Sensitivity  [] Sound Sensitivity   Endocrinology []Excessive thirst  []Excessive hunger   Psychiatric [x] Anxiety/Depression  [] Hallucination   Allergy/immunology []Hives/environmental allergies   Hematologic/lymph [] Abnormal bleeding  [] Abnormal bruising       VITALS  There were no vitals taken for this visit. PHYSICAL EXAMINATION:    General appearance: cooperative  Skin: no rash or skin lesions. HEENT: normocephalic  Optic Fundi: not testable  Neck: moves freely  Lungs: normal breathing  Heart: not testable   Peripheral pulses: not testable  Abdominal: ND  Extremities: no edema     NEUROLOGICAL EXAMINATION:   MS: awake, alert and oriented to self, wife and me only. + severe aphasia  CNs: PERRLA, EOMI, face asymmetric, droop on right. Motor: right paralysis, left moves freely  Reflexes: not testable   Coordination: FNF no dysmetria on left, unable on right, Rhomberg deferred  Gait: deferred  Sensory: Not testable    ASSESSMENT:    1. Seizure like activity: only once  2. History of stroke   3. Recent hospitalization (foot infection)    PLANS:    1. Discussed with pt and wife about getting head imaging then decide possible reduce keppra dose, wife prefers wait head imaging when covid pandemic is over. Continue keppra at current dose (500mg BID) now. 2. Continue good blood pressure and sugar control   3. Follow with vascular surgery about his eliquis  4. Obtain recent hospitalization records  5.  Continue antibiotics per ID for foot infection       This is a virtual Visit with patient's consent and align with [facility] policy to reduce the patient's risk of exposure to COVID-19 and provide continuity of care for an established/new patient. Patient and wife were at home, provider was in clinic during this encounter. I discussed with the patient the nature of our telehealth visits via interactive/real-time audio/video that:    - Diagnosis and treatments are based on my evaluation from virtual encounter  - Virtual sessions are not being recorded and that personal health information is protected  - Our clinic staff will provide follow up care in person if/when the patient needs it.       RTC in 7-8 months      Eve Parrish MD, MS

## 2021-01-21 ENCOUNTER — VIRTUAL VISIT (OUTPATIENT)
Dept: NEUROLOGY | Age: 71
End: 2021-01-21
Payer: MEDICARE

## 2021-01-21 DIAGNOSIS — R56.9 SEIZURE-LIKE ACTIVITY (HCC): ICD-10-CM

## 2021-01-21 DIAGNOSIS — Z86.73 HISTORY OF STROKE: ICD-10-CM

## 2021-01-21 DIAGNOSIS — L08.9 FOOT INFECTION: Primary | ICD-10-CM

## 2021-01-21 PROCEDURE — 99214 OFFICE O/P EST MOD 30 MIN: CPT | Performed by: PSYCHIATRY & NEUROLOGY

## 2021-01-21 NOTE — PATIENT INSTRUCTIONS
1. Continue Keppra at 500mg twice a day  2. Continue treating foot infection   3. Continue good blood sugar and pressure control   4. Obtain outside records (recent hospitalization).      Return in 7-8 months    Jalyn Copeland MD, MS

## 2021-01-25 LAB
ANION GAP SERPL CALCULATED.3IONS-SCNC: 7.7 MMOL/L
BASOPHILS %: 1.23 (ref 0–3)
BASOPHILS ABSOLUTE: 0.15 (ref 0–0.3)
BUN BLDV-MCNC: 14 MG/DL (ref 9–20)
C-REACTIVE PROTEIN: < 0.5 MG/DL (ref 0–1)
CALCIUM SERPL-MCNC: 8.5 MG/DL (ref 8.4–10.2)
CHLORIDE BLD-SCNC: 104 MMOL/L (ref 98–120)
CO2: 28 MMOL/L (ref 22–31)
CREAT SERPL-MCNC: 0.7 MG/DL (ref 0.7–1.3)
EOSINOPHILS %: 9.6 (ref 0–10)
EOSINOPHILS ABSOLUTE: 1.17 (ref 0–1.1)
GFR CALCULATED: > 60
GLUCOSE: 172 MG/DL (ref 75–110)
HBA1C MFR BLD: 6.3 % (ref 4.4–6.4)
HCT VFR BLD CALC: 40 % (ref 42–52)
HEMOGLOBIN: 12.6 (ref 13.8–17.8)
LYMPHOCYTE %: 17.19 (ref 20–51.1)
LYMPHOCYTES ABSOLUTE: 2.09 (ref 1–5.5)
MCH RBC QN AUTO: 27 PG (ref 28.5–32.5)
MCHC RBC AUTO-ENTMCNC: 31.5 G/DL (ref 32–37)
MCV RBC AUTO: 85.8 FL (ref 80–94)
MONOCYTES %: 8.59 (ref 1.7–9.3)
MONOCYTES ABSOLUTE: 1.04 (ref 0.1–1)
NEUTROPHILS %: 63.4 (ref 42.2–75.2)
NEUTROPHILS ABSOLUTE: 7.7 (ref 2–8.1)
PDW BLD-RTO: 12.5 % (ref 10–15.5)
PLATELET # BLD: 203.4 THOU/MM3 (ref 130–400)
POTASSIUM SERPL-SCNC: 3.9 MMOL/L (ref 3.6–5)
RBC: 4.66 M/UL (ref 4.7–6.1)
SODIUM BLD-SCNC: 139 MMOL/L (ref 135–145)
WBC: 12.1 THOU/ML3 (ref 4.8–10.8)

## 2021-01-26 LAB — SEDIMENTATION RATE, ERYTHROCYTE: 15 MM/HR (ref 0–20)

## 2021-02-08 PROBLEM — D72.829 LEUKOCYTOSIS: Status: ACTIVE | Noted: 2021-02-08

## 2021-02-15 DIAGNOSIS — N40.0 BENIGN NON-NODULAR PROSTATIC HYPERPLASIA WITHOUT LOWER URINARY TRACT SYMPTOMS: ICD-10-CM

## 2021-02-15 RX ORDER — CARVEDILOL 3.12 MG/1
TABLET ORAL
Qty: 30 TABLET | Refills: 0 | Status: SHIPPED | OUTPATIENT
Start: 2021-02-15 | End: 2021-03-12

## 2021-02-15 RX ORDER — CLOPIDOGREL BISULFATE 75 MG/1
TABLET ORAL
Qty: 30 TABLET | Refills: 0 | Status: SHIPPED | OUTPATIENT
Start: 2021-02-15 | End: 2021-03-29

## 2021-02-15 RX ORDER — TAMSULOSIN HYDROCHLORIDE 0.4 MG/1
CAPSULE ORAL
Qty: 90 CAPSULE | Refills: 0 | Status: SHIPPED | OUTPATIENT
Start: 2021-02-15

## 2021-02-15 RX ORDER — SIMVASTATIN 40 MG
40 TABLET ORAL NIGHTLY
Qty: 30 TABLET | Refills: 0 | Status: SHIPPED | OUTPATIENT
Start: 2021-02-15 | End: 2021-03-24

## 2021-02-15 RX ORDER — FINASTERIDE 5 MG/1
5 TABLET, FILM COATED ORAL DAILY
Qty: 30 TABLET | Refills: 0 | Status: SHIPPED | OUTPATIENT
Start: 2021-02-15 | End: 2021-03-12

## 2021-02-15 NOTE — TELEPHONE ENCOUNTER
Next Visit Date:  Future Appointments   Date Time Provider Rose Rodriguez   8/31/2021 10:40 AM Glenroy Collier MD [facility] [address] Maintenance   Topic Date Due    COVID-19 Vaccine (1 of 2) 11/12/1967    Colon cancer screen colonoscopy  11/12/2001    Diabetic microalbuminuria test  10/27/2016    Pneumococcal 65+ years Vaccine (1 of 1 - PPSV23) 11/12/2016    Diabetic retinal exam  04/24/2018    Annual Wellness Visit (AWV)  05/29/2019    Flu vaccine (1) 09/01/2020    DTaP/Tdap/Td vaccine (1 - Tdap) 06/15/2021 (Originally 11/12/1970)    Shingles Vaccine (1 of 2) 06/15/2021 (Originally 11/12/2001)    Hepatitis C screen  06/15/2021 (Originally 1950)    Diabetic foot exam  06/15/2021    Lipid screen  07/17/2021    A1C test (Diabetic or Prediabetic)  01/25/2022    Potassium monitoring  02/03/2022    Creatinine monitoring  02/03/2022    AAA screen  Completed    Hepatitis A vaccine  Aged Out    Hib vaccine  Aged Out    Meningococcal (ACWY) vaccine  Aged Out       Hemoglobin A1C (%)   Date Value   01/25/2021 6.3   10/15/2020 7.3   07/17/2020 7.8 (H)             ( goal A1C is < 7)   No results found for: LABMICR  LDL Cholesterol (mg/dL)   Date Value   07/17/2020 70   07/02/2019 57     LDL Calculated (mg/dL)   Date Value   04/10/2017 60.2       (goal LDL is <100)   AST (U/L)   Date Value   07/17/2020 15     ALT (U/L)   Date Value   07/17/2020 19     BUN (mg/dL)   Date Value   02/03/2021 17     BP Readings from Last 3 Encounters:   10/15/20 126/72   06/15/20 118/60   01/27/20 111/68          (goal 120/80)    All Future Testing planned in [facility]  Lab Frequency Next Occurrence   Microalbumin, Ur Once 07/15/2020   Microalbumin, Ur Once 11/14/2020   Creatinine, Random Urine Once 11/14/2020               Patient Active Problem List:     Ischemic stroke of frontal lobe (HCC)     Generalized seizure disorder ([facility] [address]     DM2 (diabetes mellitus, type 2) (HCC)     Hyperlipidemia     Benign prostatic hyperplasia without lower urinary tract symptoms     Coronary artery disease involving native heart without angina pectoris     Leukocytosis

## 2021-03-04 ENCOUNTER — TELEPHONE (OUTPATIENT)
Dept: ADMINISTRATIVE | Age: 71
End: 2021-03-04

## 2021-03-04 DIAGNOSIS — L89.90 PRESSURE INJURY OF SKIN, UNSPECIFIED INJURY STAGE, UNSPECIFIED LOCATION: Primary | ICD-10-CM

## 2021-03-05 ENCOUNTER — TELEPHONE (OUTPATIENT)
Dept: FAMILY MEDICINE CLINIC | Age: 71
End: 2021-03-05

## 2021-03-05 DIAGNOSIS — L89.90 PRESSURE INJURY OF SKIN, UNSPECIFIED INJURY STAGE, UNSPECIFIED LOCATION: Primary | ICD-10-CM

## 2021-03-05 RX ORDER — BACITRACIN 500 [USP'U]/G
OINTMENT TOPICAL
Qty: 28 G | Refills: 1 | Status: SHIPPED | OUTPATIENT
Start: 2021-03-05

## 2021-03-12 DIAGNOSIS — I10 BENIGN ESSENTIAL HTN: ICD-10-CM

## 2021-03-12 DIAGNOSIS — N40.0 BENIGN NON-NODULAR PROSTATIC HYPERPLASIA WITHOUT LOWER URINARY TRACT SYMPTOMS: ICD-10-CM

## 2021-03-12 RX ORDER — CARVEDILOL 3.12 MG/1
TABLET ORAL
Qty: 60 TABLET | Refills: 0 | Status: SHIPPED | OUTPATIENT
Start: 2021-03-12

## 2021-03-12 RX ORDER — FINASTERIDE 5 MG/1
TABLET, FILM COATED ORAL
Qty: 30 TABLET | Refills: 0 | Status: SHIPPED | OUTPATIENT
Start: 2021-03-12

## 2021-03-12 RX ORDER — LISINOPRIL 10 MG/1
TABLET ORAL
Qty: 90 TABLET | Refills: 0 | Status: SHIPPED | OUTPATIENT
Start: 2021-03-12 | End: 2021-06-16

## 2021-03-12 NOTE — TELEPHONE ENCOUNTER
Next Visit Date:  Future Appointments   Date Time Provider Rose Rodriguez   4/13/2021 11:00 AM MD Beverly Alston  [facility]   8/31/2021 10:40 AM Rubén Hanks MD Neuro [facility] [address] Maintenance   Topic Date Due    COVID-19 Vaccine (1) Never done    Colon cancer screen colonoscopy  Never done    Diabetic microalbuminuria test  10/27/2016    Pneumococcal 65+ years Vaccine (1 of 1 - PPSV23) Never done    Diabetic retinal exam  04/24/2018    Annual Wellness Visit (AWV)  Never done    Flu vaccine (1) Never done    DTaP/Tdap/Td vaccine (1 - Tdap) 06/15/2021 (Originally 11/12/1970)    Shingles Vaccine (1 of 2) 06/15/2021 (Originally 11/12/2001)    Hepatitis C screen  06/15/2021 (Originally 1950)    Diabetic foot exam  06/15/2021    Lipid screen  07/17/2021    A1C test (Diabetic or Prediabetic)  01/25/2022    Potassium monitoring  02/03/2022    Creatinine monitoring  02/03/2022    AAA screen  Completed    Hepatitis A vaccine  Aged Out    Hib vaccine  Aged Out    Meningococcal (ACWY) vaccine  Aged Out       Hemoglobin A1C (%)   Date Value   01/25/2021 6.3   10/15/2020 7.3   07/17/2020 7.8 (H)             ( goal A1C is < 7)   No results found for: LABMICR  LDL Cholesterol (mg/dL)   Date Value   07/17/2020 70   07/02/2019 57     LDL Calculated (mg/dL)   Date Value   04/10/2017 60.2       (goal LDL is <100)   AST (U/L)   Date Value   07/17/2020 15     ALT (U/L)   Date Value   07/17/2020 19     BUN (mg/dL)   Date Value   02/03/2021 17     BP Readings from Last 3 Encounters:   10/15/20 126/72   06/15/20 118/60   01/27/20 111/68          (goal 120/80)    All Future Testing planned in [facility]  Lab Frequency Next Occurrence   Microalbumin, Ur Once 07/15/2020   Microalbumin, Ur Once 11/14/2020   Creatinine, Random Urine Once 11/14/2020               Patient Active Problem List:     Ischemic stroke of frontal lobe (HCC)     Generalized seizure disorder ([facility] [address]     DM2 (diabetes mellitus, type 2) ([facility]     Hyperlipidemia     Benign prostatic hyperplasia without lower urinary tract symptoms     Coronary artery disease involving native heart without angina pectoris     Leukocytosis

## 2021-03-12 NOTE — TELEPHONE ENCOUNTER
Next Visit Date:  Future Appointments   Date Time Provider Rose Rodriguez   4/13/2021 11:00 AM [facility]   8/31/2021 10:40 AM Danny Beard MD Neuro Spec [address] Maintenance   Topic Date Due    COVID-19 Vaccine (1) Never done    Colon cancer screen colonoscopy  Never done    Diabetic microalbuminuria test  10/27/2016    Pneumococcal 65+ years Vaccine (1 of 1 - PPSV23) Never done    Diabetic retinal exam  04/24/2018    Annual Wellness Visit (AWV)  Never done    Flu vaccine (1) Never done    DTaP/Tdap/Td vaccine (1 - Tdap) 06/15/2021 (Originally 11/12/1970)    Shingles Vaccine (1 of 2) 06/15/2021 (Originally 11/12/2001)    Hepatitis C screen  06/15/2021 (Originally 1950)    Diabetic foot exam  06/15/2021    Lipid screen  07/17/2021    A1C test (Diabetic or Prediabetic)  01/25/2022    Potassium monitoring  02/03/2022    Creatinine monitoring  02/03/2022    AAA screen  Completed    Hepatitis A vaccine  Aged Out    Hib vaccine  Aged Out    Meningococcal (ACWY) vaccine  Aged Out       Hemoglobin A1C (%)   Date Value   01/25/2021 6.3   10/15/2020 7.3   07/17/2020 7.8 (H)             ( goal A1C is < 7)   No results found for: LABMICR  LDL Cholesterol (mg/dL)   Date Value   07/17/2020 70   07/02/2019 57     LDL Calculated (mg/dL)   Date Value   04/10/2017 60.2       (goal LDL is <100)   AST (U/L)   Date Value   07/17/2020 15     ALT (U/L)   Date Value   07/17/2020 19     BUN (mg/dL)   Date Value   02/03/2021 17     BP Readings from Last 3 Encounters:   10/15/20 126/72   06/15/20 118/60   01/27/20 111/68          (goal 120/80)    All Future Testing planned in [facility]  Lab Frequency Next Occurrence   Microalbumin, Ur Once 07/15/2020   Microalbumin, Ur Once 11/14/2020   Creatinine, Random Urine Once 11/14/2020               Patient Active Problem List:     Ischemic stroke of frontal lobe (HCC)     Generalized seizure disorder ([address]     DM2 (diabetes mellitus, type 2) ([facility]     Hyperlipidemia     Benign prostatic hyperplasia without lower urinary tract symptoms     Coronary artery disease involving native heart without angina pectoris     Leukocytosis

## 2021-03-23 NOTE — TELEPHONE ENCOUNTER
Next Visit Date:  Future Appointments   Date Time Provider Rose Rodriguez   4/13/2021 11:00 AM MD Beverly Leung  [facility]   8/31/2021 10:40 AM Brayden Aviles MD [facility] [address] Maintenance   Topic Date Due    COVID-19 Vaccine (1) Never done    Colon cancer screen colonoscopy  Never done    Diabetic microalbuminuria test  10/27/2016    Pneumococcal 65+ years Vaccine (1 of 1 - PPSV23) Never done    Diabetic retinal exam  04/24/2018    Annual Wellness Visit (AWV)  Never done    Flu vaccine (1) Never done    DTaP/Tdap/Td vaccine (1 - Tdap) 06/15/2021 (Originally 11/12/1969)    Shingles Vaccine (1 of 2) 06/15/2021 (Originally 11/12/2001)    Hepatitis C screen  06/15/2020 (Originally 1950)    Diabetic foot exam  06/15/2021    Lipid screen  07/17/2021    A1C test (Diabetic or Prediabetic)  01/25/2022    Potassium monitoring  02/03/2022    Creatinine monitoring  02/03/2022    AAA screen  Completed    Hepatitis A vaccine  Aged Out    Hib vaccine  Aged Out    Meningococcal (ACWY) vaccine  Aged Out       Hemoglobin A1C (%)   Date Value   01/25/2021 6.3   10/15/2020 7.3   07/17/2020 7.8 (H)             ( goal A1C is < 7)   No results found for: LABMICR  LDL Cholesterol (mg/dL)   Date Value   07/17/2020 70   07/02/2019 57     LDL Calculated (mg/dL)   Date Value   04/10/2017 60.2       (goal LDL is <100)   AST (U/L)   Date Value   07/17/2020 15     ALT (U/L)   Date Value   07/17/2020 19     BUN (mg/dL)   Date Value   02/03/2021 17     BP Readings from Last 3 Encounters:   10/15/20 126/72   06/15/20 118/60   01/27/20 111/68          (goal 120/80)    All Future Testing planned in [facility]  Lab Frequency Next Occurrence   Microalbumin, Ur Once 07/15/2020   Microalbumin, Ur Once 11/14/2020   Creatinine, Random Urine Once 11/14/2020               Patient Active Problem List:     Ischemic stroke of frontal lobe (HCC)     Generalized seizure disorder ([address]     DM2 (diabetes mellitus, type 2) ([facility]     Hyperlipidemia     Benign prostatic hyperplasia without lower urinary tract symptoms     Coronary artery disease involving native heart without angina pectoris     Leukocytosis

## 2021-03-24 RX ORDER — SIMVASTATIN 40 MG
40 TABLET ORAL NIGHTLY
Qty: 30 TABLET | Refills: 0 | Status: SHIPPED | OUTPATIENT
Start: 2021-03-24

## 2021-03-29 RX ORDER — CLOPIDOGREL BISULFATE 75 MG/1
TABLET ORAL
Qty: 30 TABLET | Refills: 0 | Status: SHIPPED | OUTPATIENT
Start: 2021-03-29

## 2021-03-29 NOTE — TELEPHONE ENCOUNTER
Next Visit Date:  Future Appointments   Date Time Provider Rose Rodriguez   4/13/2021 11:00 AM MD Beverly Valdez  [facility]   8/31/2021 10:40 AM Tim White MD [facility] [address] Maintenance   Topic Date Due    COVID-19 Vaccine (1) Never done    Colon cancer screen colonoscopy  Never done    Diabetic microalbuminuria test  10/27/2016    Pneumococcal 65+ years Vaccine (1 of 1 - PPSV23) Never done    Diabetic retinal exam  04/24/2018    Annual Wellness Visit (AWV)  Never done    Flu vaccine (1) Never done    DTaP/Tdap/Td vaccine (1 - Tdap) 06/15/2021 (Originally [birth year]1970)    Shingles Vaccine (1 of 2) 06/15/2021 (Originally 11/12/2001)    Hepatitis C screen  06/15/2021 (Originally 1950)    Diabetic foot exam  06/15/2021    Lipid screen  07/17/2021    A1C test (Diabetic or Prediabetic)  01/25/2022    Potassium monitoring  02/03/2022    Creatinine monitoring  02/03/2022    AAA screen  Completed    Hepatitis A vaccine  Aged Out    Hib vaccine  Aged Out    Meningococcal (ACWY) vaccine  Aged Out       Hemoglobin A1C (%)   Date Value   01/25/2021 6.3   10/15/2020 7.3   07/17/2020 7.8 (H)             ( goal A1C is < 7)   No results found for: LABMICR  LDL Cholesterol (mg/dL)   Date Value   07/17/2020 70   07/02/2019 57     LDL Calculated (mg/dL)   Date Value   04/10/2017 60.2       (goal LDL is <100)   AST (U/L)   Date Value   07/17/2020 15     ALT (U/L)   Date Value   07/17/2020 19     BUN (mg/dL)   Date Value   02/03/2021 17     BP Readings from Last 3 Encounters:   10/15/20 126/72   06/15/20 118/60   01/27/20 111/68          (goal 120/80)    All Future Testing planned in [facility]  Lab Frequency Next Occurrence   Microalbumin, Ur Once 07/15/2020   Microalbumin, Ur Once 11/14/2020   Creatinine, Random Urine Once 11/14/2020               Patient Active Problem List:     Ischemic stroke of frontal lobe (HCC)     Generalized seizure disorder ([address]     DM2 (diabetes mellitus, type 2) ([facility]     Hyperlipidemia     Benign prostatic hyperplasia without lower urinary tract symptoms     Coronary artery disease involving native heart without angina pectoris     Leukocytosis

## 2021-06-16 DIAGNOSIS — I10 BENIGN ESSENTIAL HTN: ICD-10-CM

## 2021-06-16 RX ORDER — LISINOPRIL 10 MG/1
TABLET ORAL
Qty: 90 TABLET | Refills: 0 | Status: SHIPPED | OUTPATIENT
Start: 2021-06-16

## 2021-09-20 ENCOUNTER — TELEPHONE (OUTPATIENT)
Dept: FAMILY MEDICINE CLINIC | Age: 71
End: 2021-09-20

## 2023-08-01 ENCOUNTER — TELEPHONE (OUTPATIENT)
Dept: ONCOLOGY | Age: 73
End: 2023-08-01

## 2023-08-14 ENCOUNTER — TELEPHONE (OUTPATIENT)
Dept: ONCOLOGY | Age: 73
End: 2023-08-14